# Patient Record
Sex: FEMALE | Race: BLACK OR AFRICAN AMERICAN | NOT HISPANIC OR LATINO | Employment: UNEMPLOYED | URBAN - METROPOLITAN AREA
[De-identification: names, ages, dates, MRNs, and addresses within clinical notes are randomized per-mention and may not be internally consistent; named-entity substitution may affect disease eponyms.]

---

## 2019-08-16 DIAGNOSIS — Z00.00 ROUTINE GENERAL MEDICAL EXAMINATION AT A HEALTH CARE FACILITY: Primary | ICD-10-CM

## 2019-09-12 ENCOUNTER — OFFICE VISIT (OUTPATIENT)
Dept: OBSTETRICS AND GYNECOLOGY | Facility: CLINIC | Age: 54
End: 2019-09-12
Payer: MEDICAID

## 2019-09-12 VITALS — WEIGHT: 274 LBS | HEIGHT: 64 IN | BODY MASS INDEX: 46.78 KG/M2

## 2019-09-12 DIAGNOSIS — E66.01 MORBID OBESITY: ICD-10-CM

## 2019-09-12 DIAGNOSIS — Z01.419 ENCOUNTER FOR GYNECOLOGICAL EXAMINATION WITHOUT ABNORMAL FINDING: Primary | ICD-10-CM

## 2019-09-12 DIAGNOSIS — N89.8 VAGINAL DRYNESS: ICD-10-CM

## 2019-09-12 PROCEDURE — 99386 PREV VISIT NEW AGE 40-64: CPT | Mod: S$GLB,,, | Performed by: OBSTETRICS & GYNECOLOGY

## 2019-09-12 PROCEDURE — 99386 PR PREVENTIVE VISIT,NEW,40-64: ICD-10-PCS | Mod: S$GLB,,, | Performed by: OBSTETRICS & GYNECOLOGY

## 2019-09-12 RX ORDER — TIMOLOL MALEATE 5 MG/ML
SOLUTION/ DROPS OPHTHALMIC
COMMUNITY
Start: 2019-08-28

## 2019-09-12 RX ORDER — ARIPIPRAZOLE 5 MG/1
TABLET ORAL
COMMUNITY
End: 2019-12-16

## 2019-09-12 RX ORDER — METFORMIN HYDROCHLORIDE 1000 MG/1
1000 TABLET ORAL 2 TIMES DAILY
Refills: 0 | COMMUNITY
Start: 2019-09-03

## 2019-09-12 RX ORDER — MONTELUKAST SODIUM 10 MG/1
10 TABLET ORAL DAILY
Refills: 1 | COMMUNITY
Start: 2019-07-30

## 2019-09-12 RX ORDER — LATANOPROST 50 UG/ML
SOLUTION/ DROPS OPHTHALMIC
COMMUNITY
Start: 2019-08-28

## 2019-09-12 RX ORDER — CARVEDILOL 25 MG/1
25 TABLET ORAL 2 TIMES DAILY
Refills: 3 | COMMUNITY
Start: 2019-09-03

## 2019-09-12 RX ORDER — LOSARTAN POTASSIUM 100 MG/1
100 TABLET ORAL DAILY
Refills: 1 | COMMUNITY
Start: 2019-08-30 | End: 2019-12-16

## 2019-09-12 RX ORDER — OMEPRAZOLE 40 MG/1
CAPSULE, DELAYED RELEASE ORAL
COMMUNITY
Start: 2019-08-07

## 2019-09-12 RX ORDER — GLUCOSAM/CHON-MSM1/C/MANG/BOSW 500-416.6
TABLET ORAL
Refills: 3 | COMMUNITY
Start: 2019-07-02 | End: 2019-12-16

## 2019-09-12 RX ORDER — PEN NEEDLE, DIABETIC 31 GX5/16"
NEEDLE, DISPOSABLE MISCELLANEOUS
Refills: 3 | COMMUNITY
Start: 2019-07-02 | End: 2019-12-16

## 2019-09-12 RX ORDER — LOSARTAN POTASSIUM AND HYDROCHLOROTHIAZIDE 25; 100 MG/1; MG/1
TABLET ORAL
COMMUNITY
End: 2019-12-16

## 2019-09-12 RX ORDER — ALBUTEROL SULFATE 90 UG/1
AEROSOL, METERED RESPIRATORY (INHALATION)
COMMUNITY
End: 2019-12-16

## 2019-09-12 RX ORDER — TRAVOPROST OPHTHALMIC SOLUTION 0.04 MG/ML
SOLUTION OPHTHALMIC
COMMUNITY

## 2019-09-12 NOTE — PROGRESS NOTES
Subjective:       Patient ID: Marylin Jennings is a 54 y.o. female.    Chief Complaint:  Annual Exam      History of Present Illness  Pt here for gyn annual.  History and past labs reviewed with patient.    Complaints vag dryness dueing sex.  No other complaints. No vag bleeding      Review of Systems  Review of Systems   Constitutional: Negative for chills and fever.   Respiratory: Negative for shortness of breath.    Cardiovascular: Negative for chest pain.   Gastrointestinal: Negative for abdominal pain, blood in stool, constipation, diarrhea, nausea, vomiting and reflux.   Genitourinary: Negative for dysmenorrhea, dyspareunia, dysuria, hematuria, hot flashes, menorrhagia, menstrual problem, pelvic pain, vaginal bleeding, vaginal discharge, postcoital bleeding and vaginal dryness.   Musculoskeletal: Negative for arthralgias and joint swelling.   Integumentary:  Negative for rash, hair changes, breast mass, nipple discharge and breast skin changes.   Psychiatric/Behavioral: Negative for depression. The patient is not nervous/anxious.    Breast: Negative for asymmetry, lump, mass, nipple discharge and skin changes          Objective:    Physical Exam:   Constitutional: She appears well-developed and well-nourished. No distress.    HENT:   Head: Normocephalic and atraumatic.    Eyes: Conjunctivae and EOM are normal.    Neck: No tracheal deviation present. No thyromegaly present.    Cardiovascular: Exam reveals no clubbing, no cyanosis and no edema.     Pulmonary/Chest: Effort normal. No respiratory distress.        Abdominal: Soft. She exhibits no distension and no mass. There is no tenderness. There is no rebound and no guarding. No hernia.     Genitourinary: Rectum normal, vagina normal and uterus normal. Pelvic exam was performed with patient supine. There is no rash, tenderness, lesion or injury on the right labia. There is no rash, tenderness, lesion or injury on the left labia. Cervix is normal. Right adnexum  displays no mass, no tenderness and no fullness. Left adnexum displays no mass, no tenderness and no fullness.                Skin: She is not diaphoretic. No cyanosis. Nails show no clubbing.        breast exam wnl- no nipple dc, skin changes, masses or lymph nodes palpated bilaterally    Assessment:        1. Encounter for gynecological examination without abnormal finding    2. Morbid obesity    3. Vaginal dryness               Plan:      Annual   Pap today   MMG done  Colonoscopy done  Risk assessment for inherited gyn cancer done   RTC  1 year

## 2019-09-12 NOTE — LETTER
September 12, 2019      Juanito Good MD  [address]  [place] [address]           [facility]  [address]  [place] [address]  Phone: [phone]  Fax: [phone]          Patient: Marylin Jennings   MR Number: [id number]   YOB: 1965   Date of Visit: 9/12/2019       Dear Dr. Juanito Good:    Thank you for referring Marylin Jennings to me for evaluation. Attached you will find relevant portions of my assessment and plan of care.    If you have questions, please do not hesitate to call me. I look forward to following Marylin Jennings along with you.    Sincerely,    Josh Means MD    Enclosure  CC:  No Recipients    If you would like to receive this communication electronically, please contact [email or web address] or [phone] to request more information on [employer] access.    For providers and/or their staff who would like to refer a patient to [facility], please contact us through our one-stop-shop provider referral line, [facility], at [phone].    If you feel you have received this communication in error or would no longer like to receive these types of communications, please e-mail [email or web address]

## 2019-12-16 ENCOUNTER — OFFICE VISIT (OUTPATIENT)
Dept: ORTHOPEDICS | Facility: CLINIC | Age: 54
End: 2019-12-16
Payer: MEDICAID

## 2019-12-16 VITALS — HEIGHT: 63 IN | WEIGHT: 269.5 LBS | TEMPERATURE: 98 F | BODY MASS INDEX: 47.75 KG/M2

## 2019-12-16 DIAGNOSIS — M19.012 GLENOHUMERAL ARTHRITIS, LEFT: Primary | ICD-10-CM

## 2019-12-16 PROCEDURE — 99201 PR OFFICE/OUTPT VISIT,NEW,LEVL I: ICD-10-PCS | Mod: 25,S$GLB,, | Performed by: ORTHOPAEDIC SURGERY

## 2019-12-16 PROCEDURE — 99201 PR OFFICE/OUTPT VISIT,NEW,LEVL I: CPT | Mod: 25,S$GLB,, | Performed by: ORTHOPAEDIC SURGERY

## 2019-12-16 PROCEDURE — 20610 DRAIN/INJ JOINT/BURSA W/O US: CPT | Mod: LT,S$GLB,, | Performed by: ORTHOPAEDIC SURGERY

## 2019-12-16 PROCEDURE — 20610 LARGE JOINT ASPIRATION/INJECTION: L GLENOHUMERAL: ICD-10-PCS | Mod: LT,S$GLB,, | Performed by: ORTHOPAEDIC SURGERY

## 2019-12-16 RX ORDER — FLUTICASONE PROPIONATE 50 MCG
1 SPRAY, SUSPENSION (ML) NASAL DAILY
Refills: 0 | COMMUNITY
Start: 2019-10-29

## 2019-12-16 RX ORDER — TRIAMCINOLONE ACETONIDE 40 MG/ML
40 INJECTION, SUSPENSION INTRA-ARTICULAR; INTRAMUSCULAR
Status: DISCONTINUED | OUTPATIENT
Start: 2019-12-16 | End: 2019-12-16 | Stop reason: HOSPADM

## 2019-12-16 RX ORDER — CLONAZEPAM 2 MG/1
2 TABLET ORAL 2 TIMES DAILY
Refills: 2 | COMMUNITY
Start: 2019-11-25

## 2019-12-16 RX ORDER — INSULIN GLARGINE 100 [IU]/ML
INJECTION, SOLUTION SUBCUTANEOUS
COMMUNITY

## 2019-12-16 RX ORDER — IBUPROFEN 800 MG/1
TABLET ORAL
COMMUNITY

## 2019-12-16 RX ORDER — DICLOFENAC SODIUM 10 MG/G
GEL TOPICAL
COMMUNITY
Start: 2019-11-26

## 2019-12-16 RX ORDER — ASPIRIN 81 MG/1
TABLET ORAL
COMMUNITY

## 2019-12-16 RX ORDER — LORATADINE 10 MG/1
10 TABLET ORAL DAILY
Refills: 0 | COMMUNITY
Start: 2019-11-25

## 2019-12-16 RX ORDER — PRAVASTATIN SODIUM 20 MG/1
20 TABLET ORAL DAILY
Refills: 1 | COMMUNITY
Start: 2019-11-25

## 2019-12-16 RX ORDER — IRBESARTAN 150 MG/1
150 TABLET ORAL DAILY
Refills: 2 | COMMUNITY
Start: 2019-11-25

## 2019-12-16 RX ORDER — POTASSIUM CHLORIDE 750 MG/1
CAPSULE, EXTENDED RELEASE ORAL
COMMUNITY

## 2019-12-16 RX ORDER — PROMETHAZINE HYDROCHLORIDE AND CODEINE PHOSPHATE 6.25; 1 MG/5ML; MG/5ML
SOLUTION ORAL
Refills: 0 | COMMUNITY
Start: 2019-12-09

## 2019-12-16 RX ORDER — TRIAMTERENE AND HYDROCHLOROTHIAZIDE 75; 50 MG/1; MG/1
TABLET ORAL
COMMUNITY
Start: 2019-11-12 | End: 2023-10-19

## 2019-12-16 RX ORDER — HYDROCODONE BITARTRATE AND ACETAMINOPHEN 10; 325 MG/1; MG/1
1 TABLET ORAL EVERY 6 HOURS PRN
Refills: 0 | COMMUNITY
Start: 2019-11-25

## 2019-12-16 RX ORDER — ALBUTEROL SULFATE 90 UG/1
AEROSOL, METERED RESPIRATORY (INHALATION)
COMMUNITY

## 2019-12-16 RX ADMIN — TRIAMCINOLONE ACETONIDE 40 MG: 40 INJECTION, SUSPENSION INTRA-ARTICULAR; INTRAMUSCULAR at 09:12

## 2019-12-16 NOTE — PROGRESS NOTES
Subjective:      Patient ID: Marylin Jennings is a 54 y.o. female.    Chief Complaint: Pain of the Left Shoulder    HPI 54-year-old lady with a several month history of left shoulder pain.  She denies any history of trauma.  She brings in x-rays that show osteoarthritis of the AC joint and of the glenohumeral joint    Review of Systems   Constitution: Negative for fever and weight loss.   Cardiovascular: Negative for chest pain and leg swelling.   Musculoskeletal: Positive for arthritis, joint pain and stiffness. Negative for joint swelling and muscle weakness.   Gastrointestinal: Negative for change in bowel habit.   Genitourinary: Negative for bladder incontinence and hematuria.   Neurological: Negative for focal weakness, numbness, paresthesias and sensory change.         Objective:                        Left Shoulder Exam     Inspection/Observation   Swelling: absent  Deformity: absent    Tenderness   The patient is tender to palpation of the acromioclavicular joint and greater tuberosity.    Range of Motion   Active abduction: 90   Passive abduction: 120   Extension: 40   Forward Flexion: 110 Adduction: 40   External Rotation 0 degrees: normal   Internal rotation 0 degrees: normal     Tests & Signs   Cross arm: positive  Drop arm: negative  Garner test: positive  Rotator Cuff Painful Arc/Range: moderate    Other   Sensation: normal       Muscle Strength   Left Upper Extremity  Shoulder Abduction: 4/5   Shoulder Internal Rotation: 4/5   Shoulder External Rotation: 4/5   Supraspinatus: 4/5/5   Subscapularis: 4/5/5   Biceps: 4/5/5               Assessment:       Encounter Diagnosis   Name Primary?    Glenohumeral arthritis, left Yes          Plan:       Marylin was seen today for pain.    Diagnoses and all orders for this visit:    Glenohumeral arthritis, left  -     Large Joint Aspiration/Injection: L glenohumeral

## 2019-12-16 NOTE — PROCEDURES
Large Joint Aspiration/Injection: L glenohumeral  Date/Time: 12/16/2019 9:30 AM  Performed by: Evaristo Matthew MD  Authorized by: Evaristo Matthew MD     Consent Done?:  Yes (Verbal)  Timeout: Prior to procedure the correct patient, procedure, and site was verified    Anesthesia    Anesthetic: lidocaine 1% without epinephrine  Anesthetic total: 2mL    Location:  Shoulder  Site:  L glenohumeral  Prep: Patient was prepped and draped in usual sterile fashion    Approach:  Posterior  Medications:  40 mg triamcinolone acetonide 40 mg/mL  Patient tolerance:  Patient tolerated the procedure well with no immediate complications

## 2021-02-24 ENCOUNTER — HISTORICAL (OUTPATIENT)
Dept: ADMINISTRATIVE | Facility: HOSPITAL | Age: 56
End: 2021-02-24

## 2021-11-22 ENCOUNTER — HISTORICAL (OUTPATIENT)
Dept: ADMINISTRATIVE | Facility: HOSPITAL | Age: 56
End: 2021-11-22

## 2021-11-22 LAB
ALBUMIN SERPL-MCNC: 4 GM/DL (ref 3.5–5)
ALBUMIN/GLOB SERPL: 1 RATIO (ref 1.1–2)
ALP SERPL-CCNC: 79 UNIT/L (ref 40–150)
ALT SERPL-CCNC: 17 UNIT/L (ref 0–55)
AST SERPL-CCNC: 12 UNIT/L (ref 5–34)
BILIRUB SERPL-MCNC: 0.4 MG/DL
BILIRUBIN DIRECT+TOT PNL SERPL-MCNC: 0.1 MG/DL (ref 0–0.5)
BILIRUBIN DIRECT+TOT PNL SERPL-MCNC: 0.3 MG/DL (ref 0–0.8)
BUN SERPL-MCNC: 38.7 MG/DL (ref 9.8–20.1)
CALCIUM SERPL-MCNC: 10.9 MG/DL (ref 8.7–10.5)
CHLORIDE SERPL-SCNC: 109 MMOL/L (ref 98–107)
CO2 SERPL-SCNC: 24 MMOL/L (ref 22–29)
CREAT SERPL-MCNC: 1.53 MG/DL (ref 0.55–1.02)
EST CREAT CLEARANCE SER (OHS): 33.89 ML/MIN
EST. AVERAGE GLUCOSE BLD GHB EST-MCNC: 180 MG/DL
GLOBULIN SER-MCNC: 4 GM/DL (ref 2.4–3.5)
GLUCOSE SERPL-MCNC: 113 MG/DL (ref 74–100)
HBA1C MFR BLD: 7.9 %
POTASSIUM SERPL-SCNC: 5.2 MMOL/L (ref 3.5–5.1)
PROT SERPL-MCNC: 8 GM/DL (ref 6.4–8.3)
SODIUM SERPL-SCNC: 141 MMOL/L (ref 136–145)

## 2021-11-30 ENCOUNTER — HISTORICAL (OUTPATIENT)
Dept: SURGERY | Facility: HOSPITAL | Age: 56
End: 2021-11-30

## 2021-12-13 ENCOUNTER — HISTORICAL (OUTPATIENT)
Dept: ADMINISTRATIVE | Facility: HOSPITAL | Age: 56
End: 2021-12-13

## 2022-01-20 ENCOUNTER — HISTORICAL (OUTPATIENT)
Dept: ADMINISTRATIVE | Facility: HOSPITAL | Age: 57
End: 2022-01-20

## 2022-04-10 ENCOUNTER — HISTORICAL (OUTPATIENT)
Dept: ADMINISTRATIVE | Facility: HOSPITAL | Age: 57
End: 2022-04-10
Payer: MEDICAID

## 2022-04-26 VITALS
HEIGHT: 64 IN | DIASTOLIC BLOOD PRESSURE: 94 MMHG | BODY MASS INDEX: 44.6 KG/M2 | WEIGHT: 261.25 LBS | SYSTOLIC BLOOD PRESSURE: 183 MMHG

## 2022-04-30 NOTE — H&P
Patient:   Marylin Jennings             MRN: [id number]            FIN: [id number]               Age:   56 years     Sex:  Female     :  1965   Associated Diagnoses:   None   Author:   Isaías Perez Jr, MD      Chief Complaint   Evaluation for left shoulder surgery   History of Present Illness   Patient is a 56-year-old female diabetic last A1c approximately 8 left-hand-dominant history of left rotator cuff tear with biceps tendinitis and AC arthritis who presents to clinic for booking of surgery. Seen in clinic in 2021 and wanted to have surgery done after Thanksgiving. States that since last clinic her pain is increased and she felt a pop in her shoulder. Still would like her left shoulder addressed with surgery.   Review of Systems   Constitutional: no fever, fatigue, weakness  Eye: no vision loss, eye redness, drainage, or pain  ENMT: no sore throat, ear pain, sinus pain/congestion, nasal congestion/drainage  Respiratory: no cough, no wheezing, no shortness of breath  Cardiovascular: no chest pain, no palpitations, no edema  Gastrointestinal: no nausea, vomiting, or diarrhea. No abdominal pain  Physical Exam   Vitals & Measurements HT: 162.00 cm WT: 119.600 kg BMI: 45.57   Shoulder exam Left     Symptoms: chronic     Inspection   Skin: Normal No signs of infection   Atrophy: No atrophy   Warmth: no   Erythema: no     Palpation   Tenderness: anterior joint, AC joint, bicep  Crepitation: none     ROM  Active/passive   Flexion ( 0-160): 0-90 /0-140  Extension: ( 0-50): 0-30/0-50  Abduction: (0-180): 0-90/0-130  External: ( 0-80): 0-60/0-80  Internal: pelvis/pelvis      Strength   Elevation: 4/5  ER: 4/5  IR: 5/5  ABD: 4/5    Special tests     Crossbody abduction: positive     Impingement  Neer: positive       Rotator Cuff   Hornblower: positive   Michaela's positive  Lift off: negative   External rotation lag negative     Biceps tendon  Speed's: positive   Yergason's: positive       Neurovascular    BP: 2+   RP: 2+   Sensation intact distally including axillary   Assessment/Plan   1. Rotator cuff arthropathy M12.819   Patient is a 56-year-old female diabetic, left-hand-dominant who presents to clinic for evaluation for left shoulder pain secondary to rotator cuff tear, biceps tendinitis and AC arthritis. She has failed nonoperative treatment and would like to proceed with agnostic arthroscopy with rotator cuff debridement. Consent done in clinic today.  I have had a discussion with the patient regarding the risk, benefits and alternatives to surgery. We discussed that she is still on the young side for a reverse total shoulder, however anticipate that some but not all of her symptoms will be resolved with this arthroscopic surgery.     ATTENDING ADDENDUM    Marylin Jennings was evaluated at the time of the encounter with Dr Hare. HPI, PE and treatment plan was reviewed. Treatment plan was reasonable and necessary. Imaging was reviewed at the time of visit.     The risks, benefits, outcomes, and alternatives of conservative vs operative management were discussed with the patient in clinic today. Informed consent was obtained for left shoulder arthroscopic rotator cuff repair with bicep tenotomy and distal clavicle excision.

## 2022-04-30 NOTE — OP NOTE
Patient:   Marylin Jennings             MRN: [id number]            FIN: [id number]               Age:   56 years     Sex:  Female     :  1965   Associated Diagnoses:   None   Author:   Chris Shah MD, Isaías UPTON      Operative Note   Operative Information   Date/ Time:  2021 16:19:00.     Procedures Performed: left shoulder arthroscopy, extensive debridement, rotator cuff repair, distal clavicle excision, biceps tenotomy.     Indications: Patient is a 56-year-old female diabetic last A1c approximately 8 left-hand-dominant who presents to clinic for evaluation for left shoulder pain. Patient states that she has had shoulder pain and limited function for several years now. She denies any specific inciting trauma or injury. She states she has received multiple injections as long as multiple rounds of physical therapy with minimal to no relief. Her main concern is pain throughout the left shoulder as well as inability to raise her arm.  Patient had MRI findings demonstrating a rotator cuff tear as well as AC arthritis and biceps tendinitis.  Risks and benefits of operative versus nonoperative treatment were discussed with the patient.  She elected to proceed with surgery.  Consent was obtained. Patient is a 56-year-old female diabetic last A1c approximately 8 left-hand-dominant who presents to clinic for evaluation for left shoulder pain. Patient states that she has had shoulder pain and limited function for several years now. She denies any specific inciting trauma or injury. She states she has received multiple injections as long as multiple rounds of physical therapy with minimal to no relief. Her main concern is pain throughout the left shoulder as well as inability to raise her arm.  Patient had MRI findings demonstrating a rotator cuff tear as well as AC arthritis and biceps tendinitis.  Risks and benefits of operative versus nonoperative treatment were discussed with the patient.  She elected to proceed  with surgery.  Consent was obtained. .     Preoperative Diagnosis: left rotator cuff tear, acromioclavicular arthritis, biceps tendonitis.     Postoperative Diagnosis: Same.     Surgeon: Chris Shah MD, Isaías KAY     Assistant: Payam MILLER, Mela CROOKS, Magen Mcdaniels MD.     Anesthesia: General.     Speciman Removed: None.     Description of Procedure/Findings/    Complications: Patient was taken to the operating room.  She underwent general endotracheal anesthesia.  She was placed in the beachchair position.  Bony prominences were well-padded.  The left upper extremity was prepped and draped in the usual sterile fashion.  Timeout was performed.  The posterior and anterior portals were established.  Diagnostic arthroscopy was performed.  Patient was noted to have a SLAP tear as well as some degeneration of the biceps.  The subscapularis was noted to be intact.  She had grade 1 chondral changes at the glenoid and humeral head.  Extensive debridement was performed.  Biceps tenotomy was performed.  Patient was noted to have a full-thickness rotator cuff tear.  An anterolateral portal was established and the articular side of the cuff was debrided.  We then entered the subacromial space.  Subacromial bursa was debrided.  Patient's rotator cuff tear was again identified.  The cuff was to be debrided.  Patient was noted to have a large crescent-shaped full-thickness tear.  A speed bridge repair was then performed with the addition of a fiber link anteriorly to close down the dog ear. 4.5mm anchors x 2 were placed medially. A 7mm anchor was placed anterolaterally due to poor quality bone (the 4.5mm and 5.5mm anchors pulled out when placed). A 5.5mm anchor was placed posterolaterally.  Final images were taken of the cuff repair.  Next, distal clavicle excision was performed.  We excised approximately 8 mm of bone from the distal clavicle using a bur.  Hemostasis was obtained.  Final images were obtained.  Incisions were closed  with Monocryl, Dermabond, and Steri-Strips.  A soft dressing was placed. Patient was taken to the operating room.  She underwent general endotracheal anesthesia.  She was placed in the beachchair position.  Bony prominences were well-padded.  The left upper extremity was prepped and draped in the usual sterile fashion.  Timeout was performed.  The posterior and anterior portals were established.  Diagnostic arthroscopy was performed.  Patient was noted to have a SLAP tear as well as some degeneration of the biceps.  The subscapularis was noted to be intact.  She had grade 1 chondral changes at the glenoid and humeral head.  Extensive debridement was performed.  Biceps tenotomy was performed.  Patient was noted to have a full-thickness rotator cuff tear.  An anterolateral portal was established and the articular side of the cuff was debrided.  We then entered the subacromial space.  Subacromial bursa was debrided.  Patient's rotator cuff tear was again identified.  The cuff was to be abraded.  Patient was noted to have a large crescent-shaped full-thickness tear.  A speed bridge repair was then performed with the addition of a fiber link anteriorly to close down the dog ear. 4.5mm anchors x 2 were placed medially. A 7mm anchor was placed anterolaterally due to poor quality bone (the 4.5mm and 5.5mm anchors pulled out when placed). A 5.5mm anchor was placed posterolaterally.  Final images were taken of the cuff repair.  Next, distal clavicle excision was performed.  We excised approximately 8 mm of bone from the distal clavicle using a bur.  Hemostasis was obtained.  Final images were obtained.  Incisions were closed with Monocryl, Dermabond, and Steri-Strips.  A soft dressing was placed. .     Esimated blood loss: loss  15  cc.     Findings: rotator cuff tear, AC arthritis, SLAP tear.     Complications: None.     Notes: Follow-up in clinic in 2 weeks.  In the meantime, keep dressing clean and dry.  Wear sling at all  times except work on elbow range of motion.  Nonweightbearing to the left upper extremity..       I was present and scrubbed from opening until closure.Isaías Perez MD

## 2023-01-12 ENCOUNTER — HOSPITAL ENCOUNTER (EMERGENCY)
Facility: HOSPITAL | Age: 58
Discharge: HOME | End: 2023-01-12
Attending: INTERNAL MEDICINE
Payer: MEDICARE

## 2023-01-12 VITALS
BODY MASS INDEX: 43.66 KG/M2 | DIASTOLIC BLOOD PRESSURE: 94 MMHG | SYSTOLIC BLOOD PRESSURE: 176 MMHG | RESPIRATION RATE: 17 BRPM | WEIGHT: 255.75 LBS | HEIGHT: 64 IN | TEMPERATURE: 98 F | OXYGEN SATURATION: 98 % | HEART RATE: 80 BPM

## 2023-01-12 DIAGNOSIS — R51.9 ACUTE NONINTRACTABLE HEADACHE, UNSPECIFIED HEADACHE TYPE: ICD-10-CM

## 2023-01-12 DIAGNOSIS — I10 UNCONTROLLED HYPERTENSION: Primary | ICD-10-CM

## 2023-01-12 LAB — POCT GLUCOSE: 101 MG/DL (ref 70–110)

## 2023-01-12 PROCEDURE — 82962 GLUCOSE BLOOD TEST: CPT

## 2023-01-12 PROCEDURE — 25000003 PHARM REV CODE 250: Performed by: PHYSICIAN ASSISTANT

## 2023-01-12 PROCEDURE — 99284 EMERGENCY DEPT VISIT MOD MDM: CPT | Mod: 25

## 2023-01-12 RX ORDER — BACLOFEN 20 MG/1
20 TABLET ORAL 3 TIMES DAILY PRN
Qty: 20 TABLET | Refills: 0 | OUTPATIENT
Start: 2023-01-12 | End: 2023-03-01

## 2023-01-12 RX ORDER — HYDRALAZINE HYDROCHLORIDE 25 MG/1
25 TABLET, FILM COATED ORAL
Status: COMPLETED | OUTPATIENT
Start: 2023-01-12 | End: 2023-01-12

## 2023-01-12 RX ADMIN — HYDRALAZINE HYDROCHLORIDE 25 MG: 25 TABLET ORAL at 02:01

## 2023-01-12 NOTE — DISCHARGE INSTRUCTIONS
Report to Emergency Department if symptoms return or worsen; [facility] - Medicine Clinic Within 1 to 2 days, It is important that you follow up with your primary care provider or specialist if indicated for further evaluation, workup, and treatment as necessary. The exam and treatment you received in Emergency Department was for an urgent problem and NOT INTENDED AS COMPLETE CARE. It is important that you FOLLOW UP with a doctor for ongoing care. If your symptoms become WORSE or you DO NOT IMPROVE and you are unable to reach your health care provider, you should RETURN to the Emergency Department. The Emergency Department provider has provided a PRELIMINARY INTERPRETATION of all your studies. A final interpretation may be done after you are discharged. If a change in your diagnosis or treatment is needed WE WILL CONTACT YOU. It is critical that we have a CURRENT PHONE NUMBER FOR YOU.

## 2023-01-12 NOTE — ED PROVIDER NOTES
Encounter Date: 2023       History     Chief Complaint   Patient presents with    Hypertension     PT REPORTS ELEVATED BP AT HOME, CO HA. DENIES VISION CHANGES, NO CP OR SOB.  /102 IN TRIAGE.  REPORTS COMPLIANCE W MEDS.  .      58 yo F w/ PMHx significant for HTN, HLD, obesity & DM presents to ED c/o elevated BP readings at home w/ associated HA. Patient reports this is the worst HA she's ever had. Patient reports BP of 180/90 at home. BP of 212/102 in triage. Reports full compliance w/ meds. Reports that she has not been compliant w/ diet. Saw her PCP yesterday, but states BP wasn't this elevated in clinic. Denies vision changes, slurred speech, facial drooping, photophobia, neck stiffness, vertigo, lightheadedness, confusion, focal weakness, paralysis, paresthesia, numbness, ataxia, abnormal balance, CP, SOB, palpitations, diaphoresis, syncope, orthopnea, edema. Aside from HTN, vitals otherwise stable w/ NAD.    Review of patient's allergies indicates:  No Known Allergies  Past Medical History:   Diagnosis Date    Arthritis     Depression     Diabetes mellitus     Diabetes mellitus type I     Head ache     Hepatitis C     High cholesterol     Hypertension      Past Surgical History:   Procedure Laterality Date    CARPAL TUNNEL RELEASE       SECTION      CHOLECYSTECTOMY      TOTAL KNEE ARTHROPLASTY       No family history on file.  Social History     Tobacco Use    Smoking status: Never   Substance Use Topics    Alcohol use: Never    Drug use: Never     Review of Systems   Constitutional:  Negative for appetite change, chills, fever and unexpected weight change.   HENT:  Negative for hearing loss, trouble swallowing and voice change.    Genitourinary:  Negative for difficulty urinating and enuresis.   Musculoskeletal:  Negative for arthralgias and myalgias.   Skin:  Negative for color change, pallor and rash.   Allergic/Immunologic: Negative for immunocompromised state.   All other systems  reviewed and are negative.    Physical Exam     Initial Vitals [01/12/23 1410]   BP Pulse Resp Temp SpO2   (!) 212/102 80 16 98.4 °F (36.9 °C) 98 %      MAP       --         Physical Exam    Nursing note and vitals reviewed.  Constitutional: She appears well-developed and well-nourished. She is not diaphoretic. No distress.   HENT:   Head: Normocephalic and atraumatic.   Right Ear: Hearing, tympanic membrane, external ear and ear canal normal.   Left Ear: Hearing, tympanic membrane, external ear and ear canal normal.   Mouth/Throat: Oropharynx is clear and moist. No oropharyngeal exudate.   Eyes: Conjunctivae and EOM are normal. Pupils are equal, round, and reactive to light.   Neck: Neck supple. No Brudzinski's sign and no Kernig's sign noted.   Normal range of motion.   Full passive range of motion without pain.     Cardiovascular:  Normal rate, regular rhythm, normal heart sounds and intact distal pulses.     Exam reveals no gallop and no friction rub.       No murmur heard.  Pulmonary/Chest: Breath sounds normal. No respiratory distress. She has no wheezes. She has no rhonchi. She has no rales.   Abdominal: Abdomen is soft. Bowel sounds are normal. She exhibits no distension and no mass. There is no abdominal tenderness. There is no rebound and no guarding.   Musculoskeletal:         General: No tenderness or edema. Normal range of motion.      Cervical back: Full passive range of motion without pain, normal range of motion and neck supple. No rigidity. No spinous process tenderness or muscular tenderness. Normal range of motion.     Lymphadenopathy:     She has no cervical adenopathy.   Neurological: She is alert and oriented to person, place, and time. She has normal strength. She is not disoriented. She displays no tremor. No cranial nerve deficit or sensory deficit. She exhibits normal muscle tone. She displays a negative Romberg sign. She displays no seizure activity. Coordination and gait normal. GCS score  is 15. GCS eye subscore is 4. GCS verbal subscore is 5. GCS motor subscore is 6.   Skin: Skin is warm and dry. Capillary refill takes less than 2 seconds. No rash noted. No erythema. No pallor.   Psychiatric: She has a normal mood and affect. Thought content normal.       ED Course   Procedures  Labs Reviewed   POCT GLUCOSE          Imaging Results              CT Head Without Contrast (Final result)  Result time 01/12/23 16:17:25      Final result by Darlin Ferrera MD (01/12/23 16:17:25)                   Impression:      No acute abnormality seen      Electronically signed by: Darlin Ferrera  Date:    01/12/2023  Time:    16:17               Narrative:    EXAMINATION:  CT HEAD WITHOUT CONTRAST    CLINICAL HISTORY:  Headache, new or worsening (Age >= 50y);    TECHNIQUE:  Multiple axial images were obtained from the base of the brain to the vertex without contrast administration.  Sagittal and coronal reconstructions were performed. .Automatic exposure control  (AEC) is utilized to reduce patient radiation exposure.    COMPARISON:  None available    FINDINGS:  There is no intracranial mass or lesion seen.  No hemorrhage is seen.  No infarct is seen.  The ventricles and basilar cisterns appear normal.  Brain parenchyma appears grossly unremarkable.    Posterior fossa appears normal.  The calvarium is intact.  The paranasal sinuses appear grossly unremarkable.                                       Medications   hydrALAZINE tablet 25 mg (25 mg Oral Given 1/12/23 1427)     Medical Decision Making:   Clinical Tests:   Lab Tests: Ordered and Reviewed  Radiological Study: Ordered and Reviewed  No acute abnormality on head CT. Benign neuro exam w/o focal deficits or meningeal signs. Given hydralazine in ED. Discussed importance of low-salt diet. Encouraged patient to call PCP tomorrow regarding possible med adjustment. ED precautions given.                        Clinical Impression:   Final diagnoses:  [I10]  Uncontrolled hypertension (Primary)  [R51.9] Acute nonintractable headache, unspecified headache type        ED Disposition Condition    Discharge Good          ED Prescriptions       Medication Sig Dispense Start Date End Date Auth. Provider    baclofen (LIORESAL) 20 MG tablet Take 1 tablet (20 mg total) by mouth 3 (three) times daily as needed (neck pain or headache). 20 tablet 1/12/2023 -- LILLIE Lucas          Follow-up Information       Follow up With Specialties Details Why Contact Info    Call primary care provider tomorrow to discuss possible adjustment of blood pressure medicine   As needed, If symptoms worsen     [facility] - Emergency Dept Emergency Medicine   [address] [facility] [address] [phone]             LILLIE Lucas  01/12/23 8942

## 2023-03-01 ENCOUNTER — HOSPITAL ENCOUNTER (EMERGENCY)
Facility: HOSPITAL | Age: 58
Discharge: HOME | End: 2023-03-01
Attending: EMERGENCY MEDICINE
Payer: MEDICARE

## 2023-03-01 VITALS
BODY MASS INDEX: 43.51 KG/M2 | HEIGHT: 64 IN | OXYGEN SATURATION: 100 % | TEMPERATURE: 98 F | SYSTOLIC BLOOD PRESSURE: 132 MMHG | WEIGHT: 254.88 LBS | RESPIRATION RATE: 18 BRPM | HEART RATE: 86 BPM | DIASTOLIC BLOOD PRESSURE: 78 MMHG

## 2023-03-01 DIAGNOSIS — M79.652 LEFT THIGH PAIN: Primary | ICD-10-CM

## 2023-03-01 DIAGNOSIS — R25.2 LEG CRAMPING: ICD-10-CM

## 2023-03-01 LAB
ANION GAP SERPL CALC-SCNC: 6 MEQ/L
ANION GAP SERPL CALC-SCNC: 8 MEQ/L
BUN SERPL-MCNC: 54.2 MG/DL (ref 9.8–20.1)
BUN SERPL-MCNC: 61 MG/DL (ref 9.8–20.1)
CALCIUM SERPL-MCNC: 10.5 MG/DL (ref 8.4–10.2)
CALCIUM SERPL-MCNC: 10.6 MG/DL (ref 8.4–10.2)
CHLORIDE SERPL-SCNC: 110 MMOL/L (ref 98–107)
CHLORIDE SERPL-SCNC: 111 MMOL/L (ref 98–107)
CO2 SERPL-SCNC: 20 MMOL/L (ref 23–31)
CO2 SERPL-SCNC: 22 MMOL/L (ref 23–31)
CREAT SERPL-MCNC: 1.83 MG/DL (ref 0.55–1.02)
CREAT SERPL-MCNC: 2.35 MG/DL (ref 0.55–1.02)
CREAT/UREA NIT SERPL: 26
CREAT/UREA NIT SERPL: 30
D DIMER PPP IA.FEU-MCNC: 0.37 UG/ML FEU (ref 0–0.5)
GFR SERPLBLD CREATININE-BSD FMLA CKD-EPI: 23 MLS/MIN/1.73/M2
GFR SERPLBLD CREATININE-BSD FMLA CKD-EPI: 31 MLS/MIN/1.73/M2
GLUCOSE SERPL-MCNC: 170 MG/DL (ref 82–115)
GLUCOSE SERPL-MCNC: 86 MG/DL (ref 82–115)
MAGNESIUM SERPL-MCNC: 1.8 MG/DL (ref 1.6–2.6)
POTASSIUM SERPL-SCNC: 4.7 MMOL/L (ref 3.5–5.1)
POTASSIUM SERPL-SCNC: 5 MMOL/L (ref 3.5–5.1)
SODIUM SERPL-SCNC: 138 MMOL/L (ref 136–145)
SODIUM SERPL-SCNC: 139 MMOL/L (ref 136–145)

## 2023-03-01 PROCEDURE — 80048 BASIC METABOLIC PNL TOTAL CA: CPT | Performed by: PHYSICIAN ASSISTANT

## 2023-03-01 PROCEDURE — 96361 HYDRATE IV INFUSION ADD-ON: CPT

## 2023-03-01 PROCEDURE — 83735 ASSAY OF MAGNESIUM: CPT | Performed by: PHYSICIAN ASSISTANT

## 2023-03-01 PROCEDURE — 96360 HYDRATION IV INFUSION INIT: CPT

## 2023-03-01 PROCEDURE — 25000003 PHARM REV CODE 250: Performed by: PHYSICIAN ASSISTANT

## 2023-03-01 PROCEDURE — 85379 FIBRIN DEGRADATION QUANT: CPT | Performed by: PHYSICIAN ASSISTANT

## 2023-03-01 PROCEDURE — 63600175 PHARM REV CODE 636 W HCPCS: Performed by: PHYSICIAN ASSISTANT

## 2023-03-01 PROCEDURE — 99284 EMERGENCY DEPT VISIT MOD MDM: CPT | Mod: 25

## 2023-03-01 RX ORDER — CYCLOBENZAPRINE HCL 10 MG
10 TABLET ORAL
Status: COMPLETED | OUTPATIENT
Start: 2023-03-01 | End: 2023-03-01

## 2023-03-01 RX ORDER — HYDROCODONE BITARTRATE AND ACETAMINOPHEN 7.5; 325 MG/1; MG/1
1 TABLET ORAL ONCE
Status: COMPLETED | OUTPATIENT
Start: 2023-03-01 | End: 2023-03-01

## 2023-03-01 RX ORDER — TIZANIDINE 4 MG/1
4 TABLET ORAL EVERY 6 HOURS PRN
Qty: 20 TABLET | Refills: 0 | Status: SHIPPED | OUTPATIENT
Start: 2023-03-01 | End: 2023-03-11

## 2023-03-01 RX ADMIN — HYDROCODONE BITARTRATE AND ACETAMINOPHEN 1 TABLET: 7.5; 325 TABLET ORAL at 10:03

## 2023-03-01 RX ADMIN — CYCLOBENZAPRINE 10 MG: 10 TABLET, FILM COATED ORAL at 02:03

## 2023-03-01 RX ADMIN — SODIUM CHLORIDE, POTASSIUM CHLORIDE, SODIUM LACTATE AND CALCIUM CHLORIDE 1000 ML: 600; 310; 30; 20 INJECTION, SOLUTION INTRAVENOUS at 02:03

## 2023-03-01 RX ADMIN — SODIUM CHLORIDE, POTASSIUM CHLORIDE, SODIUM LACTATE AND CALCIUM CHLORIDE 1000 ML: 600; 310; 30; 20 INJECTION, SOLUTION INTRAVENOUS at 12:03

## 2023-03-01 NOTE — ED PROVIDER NOTES
"Encounter Date: 3/1/2023       History     Chief Complaint   Patient presents with    Leg Pain     L thigh and hip pain x1wk     59 yo F w/ PMHx significant for HTN, HLD, DM, arthritis & obesity presents to ED c/o 1 week hx of constant L hip & thigh pain. Patient reports pain feels like "shayla horse." Taking NSAID & muscle relaxer w/o significant relief. Denies any falls or other known injury. Denies LE edema or calf pain. Able to bear weight & ambulate, but w/ significant pain. Denies numbness, paresthesia, paralysis, saddle anesthesia, incontinence, bowel/bladder retention, F/C, diffuse muscle cramps. VSS on arrival, patient in NAD.    Review of patient's allergies indicates:  No Known Allergies  Past Medical History:   Diagnosis Date    Arthritis     Depression     Diabetes mellitus     Diabetes mellitus type I     Head ache     Hepatitis C     High cholesterol     Hypertension      Past Surgical History:   Procedure Laterality Date    CARPAL TUNNEL RELEASE       SECTION      CHOLECYSTECTOMY      TOTAL KNEE ARTHROPLASTY       History reviewed. No pertinent family history.  Social History     Tobacco Use    Smoking status: Never   Substance Use Topics    Alcohol use: Never    Drug use: Never     Review of Systems   Eyes:  Negative for visual disturbance.   Respiratory:  Negative for cough and shortness of breath.    Cardiovascular:  Negative for chest pain and palpitations.   Gastrointestinal:  Negative for abdominal pain, diarrhea, nausea and vomiting.   Endocrine: Negative for polydipsia, polyphagia and polyuria.   Genitourinary:  Negative for decreased urine volume and hematuria.   Skin:  Negative for color change, pallor and rash.   Allergic/Immunologic: Negative for immunocompromised state.   Neurological:  Negative for dizziness, syncope and headaches.   All other systems reviewed and are negative.    Physical Exam     Initial Vitals   BP Pulse Resp Temp SpO2   23 0915 23 0913 23 " 0913 03/01/23 0913 03/01/23 0913   135/83 92 20 97.9 °F (36.6 °C) 97 %      MAP       --                Physical Exam    Nursing note and vitals reviewed.  Constitutional: She appears well-developed and well-nourished. She is not diaphoretic. No distress.   HENT:   Head: Normocephalic and atraumatic.   Mouth/Throat: Oropharynx is clear and moist. No oropharyngeal exudate.   Eyes: Conjunctivae and EOM are normal. Pupils are equal, round, and reactive to light.   Neck: Neck supple.   Normal range of motion.  Cardiovascular:  Normal rate, regular rhythm, normal heart sounds and intact distal pulses.     Exam reveals no gallop and no friction rub.       No murmur heard.  Pulmonary/Chest: Breath sounds normal. No respiratory distress. She has no wheezes. She has no rhonchi. She has no rales.   Abdominal: Abdomen is soft. Bowel sounds are normal. She exhibits no distension. There is no abdominal tenderness. There is no rebound and no guarding.   Musculoskeletal:         General: No tenderness or edema. Normal range of motion.      Cervical back: Normal range of motion and neck supple.     Neurological: She is alert and oriented to person, place, and time. She has normal strength. No cranial nerve deficit or sensory deficit.   Skin: Skin is warm and dry. Capillary refill takes less than 2 seconds. No rash noted. No erythema. No pallor.   Psychiatric: She has a normal mood and affect. Thought content normal.       ED Course   Procedures  Labs Reviewed   BASIC METABOLIC PANEL - Abnormal; Notable for the following components:       Result Value    Chloride 111 (*)     Carbon Dioxide 22 (*)     Glucose Level 170 (*)     Blood Urea Nitrogen 61.0 (*)     Creatinine 2.35 (*)     Calcium Level Total 10.6 (*)     All other components within normal limits   BASIC METABOLIC PANEL - Abnormal; Notable for the following components:    Chloride 110 (*)     Carbon Dioxide 20 (*)     Blood Urea Nitrogen 54.2 (*)     Creatinine 1.83 (*)      Calcium Level Total 10.5 (*)     All other components within normal limits   D DIMER, QUANTITATIVE - Normal   MAGNESIUM - Normal   EXTRA TUBES    Narrative:     The following orders were created for panel order EXTRA TUBES.  Procedure                               Abnormality         Status                     ---------                               -----------         ------                     Red Top Hold[[id number]]                                     In process                 Lavender Top Hold[[id number]]                                In process                   Please view results for these tests on the individual orders.   RED TOP HOLD   LAVENDER TOP HOLD          Imaging Results              X-Ray Hip 2 or 3 views Left (with Pelvis when performed) (Final result)  Result time 03/01/23 12:12:04      Final result by Wilma Bradshaw MD (03/01/23 12:12:04)                   Impression:      No displaced fracture identified.      Electronically signed by: Wilma Bradshaw  Date:    03/01/2023  Time:    12:12               Narrative:    EXAMINATION:  XR HIP WITH PELVIS WHEN PERFORMED, 2 OR 3 VIEWS LEFT    CLINICAL HISTORY:  L hip pain;    COMPARISON:  None.    FINDINGS:  There is no displaced fracture identified.  There are mild degenerative changes of the hips with small marginal osteophytes.  The soft tissues are unremarkable.                                       X-Ray Lumbar Spine Ap And Lateral (Final result)  Result time 03/01/23 12:11:20      Final result by Wilma Bradshaw MD (03/01/23 12:11:20)                   Impression:      1. No acute abnormality identified.  2. Multilevel degenerative changes of the lumbar spine.      Electronically signed by: Wilma Bradshaw  Date:    03/01/2023  Time:    12:11               Narrative:    EXAMINATION:  XR LUMBAR SPINE AP AND LATERAL    CLINICAL HISTORY:  low back pain;    COMPARISON:  None.    FINDINGS:  There are 5 non-rib-bearing lumbar type vertebral  bodies.  There is minimal retrolisthesis of L5 over S1.  The vertebral body heights are maintained.  There is mild disc height loss at L5-S1 with small marginal osteophytes.  There is mild facet arthropathy.  Scattered vascular calcifications are noted.                                       Medications   HYDROcodone-acetaminophen 7.5-325 mg per tablet 1 tablet (1 tablet Oral Given 3/1/23 1018)   lactated ringers bolus 1,000 mL (0 mLs Intravenous Stopped 3/1/23 1315)   cyclobenzaprine tablet 10 mg (10 mg Oral Given 3/1/23 1426)   lactated ringers bolus 1,000 mL (0 mLs Intravenous Stopped 3/1/23 1524)     Medical Decision Making:   Clinical Tests:   Lab Tests: Ordered and Reviewed  Radiological Study: Ordered and Reviewed  Initial BMP reveals elevated creatinine of 2.35. Patient given 2 L IVFs & Cr improved to 1.83 on repeat BMP. Discussed w/ patient importance of aggressive oral hydration over next 24 hours. Electrolytes unremarkable. D-dimer WNL, low suspicion for DVT as source of leg pain. No acute osseous abnormality on XR of low back or L hip. Patient given meds in ED & reports improvement of leg pain. Will discharge w/ muscle relaxer. Instructed to contact PCP for follow-up appointment. ED precautions given for new or worsening symptoms.           ED Course as of 03/01/23 1724   Wed Mar 01, 2023   1207 Renal indices consistent w/ prerenal [place]. Will give patient IVFs. [NB]      ED Course User Index  [NB] LILLIE Lucas                 Clinical Impression:   Final diagnoses:  [M79.652] Left thigh pain (Primary)  [R25.2] Leg cramping        ED Disposition Condition    Discharge Good          ED Prescriptions       Medication Sig Dispense Start Date End Date Auth. Provider    tiZANidine (ZANAFLEX) 4 MG tablet Take 1 tablet (4 mg total) by mouth every 6 (six) hours as needed (muscle cramps). 20 tablet 3/1/2023 3/11/2023 LILLIE Lucas          Follow-up Information       Follow up With Specialties Details Why  Contact Info    Juanito Good MD Family Medicine Schedule an appointment as soon as possible for a visit   [address]  [address]  [place] [address]      [facility] Emergency Medicine  As needed, If symptoms worsen [address] [place] [address] [phone]             LILLIE Lucas  03/01/23 172       LILLIE Lucas  03/01/23 1727

## 2023-03-01 NOTE — DISCHARGE INSTRUCTIONS
Report to Emergency Department if symptoms return or worsen; [facility] Within 1 to 2 days, It is important that you follow up with your primary care provider or specialist if indicated for further evaluation, workup, and treatment as necessary. The exam and treatment you received in Emergency Department was for an urgent problem and NOT INTENDED AS COMPLETE CARE. It is important that you FOLLOW UP with a doctor for ongoing care. If your symptoms become WORSE or you DO NOT IMPROVE and you are unable to reach your health care provider, you should RETURN to the Emergency Department. The Emergency Department provider has provided a PRELIMINARY INTERPRETATION of all your studies. A final interpretation may be done after you are discharged. If a change in your diagnosis or treatment is needed WE WILL CONTACT YOU. It is critical that we have a CURRENT PHONE NUMBER FOR YOU.

## 2023-04-28 ENCOUNTER — HOSPITAL ENCOUNTER (EMERGENCY)
Facility: HOSPITAL | Age: 58
Discharge: HOME | End: 2023-04-28
Attending: FAMILY MEDICINE
Payer: MEDICARE

## 2023-04-28 VITALS
TEMPERATURE: 98 F | HEIGHT: 64 IN | SYSTOLIC BLOOD PRESSURE: 155 MMHG | WEIGHT: 256 LBS | BODY MASS INDEX: 43.71 KG/M2 | DIASTOLIC BLOOD PRESSURE: 97 MMHG | RESPIRATION RATE: 20 BRPM | HEART RATE: 77 BPM | OXYGEN SATURATION: 98 %

## 2023-04-28 DIAGNOSIS — M25.561 RIGHT KNEE PAIN, UNSPECIFIED CHRONICITY: Primary | ICD-10-CM

## 2023-04-28 DIAGNOSIS — M17.10 ARTHRITIS OF KNEE: ICD-10-CM

## 2023-04-28 DIAGNOSIS — R52 PAIN: ICD-10-CM

## 2023-04-28 PROCEDURE — 25000003 PHARM REV CODE 250: Performed by: PHYSICIAN ASSISTANT

## 2023-04-28 PROCEDURE — 99283 EMERGENCY DEPT VISIT LOW MDM: CPT

## 2023-04-28 RX ORDER — HYDROCODONE BITARTRATE AND ACETAMINOPHEN 10; 325 MG/1; MG/1
1 TABLET ORAL
Status: COMPLETED | OUTPATIENT
Start: 2023-04-28 | End: 2023-04-28

## 2023-04-28 RX ADMIN — HYDROCODONE BITARTRATE AND ACETAMINOPHEN 1 TABLET: 10; 325 TABLET ORAL at 11:04

## 2023-04-28 NOTE — ED PROVIDER NOTES
Encounter Date: 2023       History     Chief Complaint   Patient presents with    Knee Pain     PT W LT KNEE PAIN X 3 DAYS.  DENIES INJURY.       Patient reports to the ER with complaints of knee pain; pt denies fall/injury but does report pain in the pst similar to this    The history is provided by the patient.   Knee Pain  This is a recurrent problem. The current episode started more than 2 days ago. The problem occurs constantly. The problem has not changed since onset.Pertinent negatives include no chest pain, no abdominal pain, no headaches and no shortness of breath. The symptoms are aggravated by walking and standing. The symptoms are relieved by rest. She has tried rest for the symptoms. The treatment provided mild relief.   Review of patient's allergies indicates:  No Known Allergies  Past Medical History:   Diagnosis Date    Arthritis     Depression     Diabetes mellitus     Diabetes mellitus type I     Head ache     Hepatitis C     High cholesterol     Hypertension      Past Surgical History:   Procedure Laterality Date    CARPAL TUNNEL RELEASE       SECTION      CHOLECYSTECTOMY      TOTAL KNEE ARTHROPLASTY       History reviewed. No pertinent family history.  Social History     Tobacco Use    Smoking status: Never    Smokeless tobacco: Never   Substance Use Topics    Alcohol use: Never    Drug use: Never     Review of Systems   Constitutional:  Negative for fever.   HENT:  Negative for sore throat.    Eyes: Negative.    Respiratory:  Negative for shortness of breath.    Cardiovascular:  Negative for chest pain.   Gastrointestinal:  Negative for abdominal pain and nausea.   Genitourinary:  Negative for dysuria.   Musculoskeletal:  Negative for back pain.   Skin:  Negative for rash.   Neurological:  Negative for weakness and headaches.   Hematological:  Does not bruise/bleed easily.   Psychiatric/Behavioral: Negative.       Physical Exam     Initial Vitals [23 0907]   BP Pulse Resp Temp  SpO2   (!) 155/97 77 16 97.9 °F (36.6 °C) 98 %      MAP       --         Physical Exam    Vitals reviewed.  Constitutional: She appears well-developed and well-nourished.   HENT:   Head: Normocephalic and atraumatic.   Eyes: Conjunctivae and EOM are normal. Pupils are equal, round, and reactive to light.   Neck: Neck supple.   Normal range of motion.  Cardiovascular:  Normal rate, regular rhythm and normal heart sounds.           Pulmonary/Chest: Breath sounds normal. No respiratory distress. She has no wheezes. She has no rhonchi. She exhibits no tenderness.   Abdominal: Abdomen is soft. Bowel sounds are normal. She exhibits no distension. There is no abdominal tenderness. There is no rebound.   Musculoskeletal:      Cervical back: Normal range of motion and neck supple.      Right knee: Normal. Normal pulse.      Left knee: No swelling. Decreased range of motion. Tenderness present. Normal pulse.        Legs:      Neurological: She is alert and oriented to person, place, and time. She displays normal reflexes. No cranial nerve deficit or sensory deficit.   Skin: Skin is warm and dry.   Psychiatric: She has a normal mood and affect. Her behavior is normal. Judgment and thought content normal.       ED Course   Procedures  Labs Reviewed - No data to display       Imaging Results              X-Ray Knee 3 View Left (Final result)  Result time 04/28/23 11:22:44      Final result by Josh Prasad MD (04/28/23 11:22:44)                   Impression:      Degenerative changes.      Electronically signed by: Josh Prasad  Date:    04/28/2023  Time:    11:22               Narrative:    EXAMINATION:  XR KNEE 3 VIEW LEFT    CLINICAL HISTORY:  Pain, unspecified    COMPARISON:  None    FINDINGS:  Three views of the left knee demonstrate no fracture or dislocation.  There are degenerative changes with prominent osteophytosis.  No sizeable joint effusion.                                       Medications    HYDROcodone-acetaminophen  mg per tablet 1 tablet (has no administration in time range)                 ED Course as of 04/28/23 1145   Fri Apr 28, 2023   1117 Patient was called at 1030, 1045, and 1100 in the waiting room with no answer; pt also did not answer her phone . . . Patient just returned from the cafeteria (never informed staff) [AL]      ED Course User Index  [AL] LILLIE Horn                 Clinical Impression:   Final diagnoses:  [R52] Pain  [M25.561] Right knee pain, unspecified chronicity (Primary)  [M17.10] Arthritis of knee        ED Disposition Condition    Discharge Stable          ED Prescriptions    None       Follow-up Information       Follow up With Specialties Details Why Contact Info    discharge followup    If your symptoms become WORSE or you DO NOT IMPROVE and you are unable to reach your health care provider, you should RETURN to the emergency department    discharge info    Discussed all pertinent ED information, results, diagnosis and treatment plan; All questions and concerns were addressed at this time. Patient voices understanding of information and instructions. Patient is comfortable with plan and discharge    Juanito Good MD Family Medicine   [address]  [address]  [place] [address]               LILLIE Horn  04/28/23 1141

## 2023-06-15 DIAGNOSIS — M25.519 SHOULDER PAIN, UNSPECIFIED CHRONICITY, UNSPECIFIED LATERALITY: Primary | ICD-10-CM

## 2023-06-22 DIAGNOSIS — M25.512 LEFT SHOULDER PAIN: Primary | ICD-10-CM

## 2023-06-29 ENCOUNTER — OFFICE VISIT (OUTPATIENT)
Dept: ORTHOPEDICS | Facility: CLINIC | Age: 58
End: 2023-06-29
Payer: MEDICARE

## 2023-06-29 ENCOUNTER — HOSPITAL ENCOUNTER (OUTPATIENT)
Dept: RADIOLOGY | Facility: CLINIC | Age: 58
Discharge: HOME | End: 2023-06-29
Attending: ORTHOPAEDIC SURGERY
Payer: MEDICARE

## 2023-06-29 ENCOUNTER — LAB VISIT (OUTPATIENT)
Dept: LAB | Facility: HOSPITAL | Age: 58
End: 2023-06-29
Attending: ORTHOPAEDIC SURGERY
Payer: MEDICARE

## 2023-06-29 VITALS — BODY MASS INDEX: 43.71 KG/M2 | WEIGHT: 256 LBS | HEIGHT: 64 IN

## 2023-06-29 DIAGNOSIS — E11.22 TYPE 2 DIABETES MELLITUS WITH CHRONIC KIDNEY DISEASE, WITH LONG-TERM CURRENT USE OF INSULIN, UNSPECIFIED CKD STAGE: ICD-10-CM

## 2023-06-29 DIAGNOSIS — R52 PAIN MANAGEMENT: ICD-10-CM

## 2023-06-29 DIAGNOSIS — E66.01 OBESITY, MORBID: ICD-10-CM

## 2023-06-29 DIAGNOSIS — Z79.4 TYPE 2 DIABETES MELLITUS WITH CHRONIC KIDNEY DISEASE, WITH LONG-TERM CURRENT USE OF INSULIN, UNSPECIFIED CKD STAGE: ICD-10-CM

## 2023-06-29 DIAGNOSIS — M25.512 LEFT SHOULDER PAIN: ICD-10-CM

## 2023-06-29 DIAGNOSIS — Z98.890 HISTORY OF REPAIR OF LEFT ROTATOR CUFF: ICD-10-CM

## 2023-06-29 DIAGNOSIS — M19.012 PRIMARY OSTEOARTHRITIS OF LEFT SHOULDER: Primary | ICD-10-CM

## 2023-06-29 LAB
EST. AVERAGE GLUCOSE BLD GHB EST-MCNC: 131.2 MG/DL
HBA1C MFR BLD: 6.2 %

## 2023-06-29 PROCEDURE — 73030 X-RAY EXAM OF SHOULDER: CPT | Mod: LT,,, | Performed by: ORTHOPAEDIC SURGERY

## 2023-06-29 PROCEDURE — 73030 XR SHOULDER COMPLETE 2 OR MORE VIEWS LEFT: ICD-10-PCS | Mod: LT,,, | Performed by: ORTHOPAEDIC SURGERY

## 2023-06-29 PROCEDURE — 36415 COLL VENOUS BLD VENIPUNCTURE: CPT

## 2023-06-29 PROCEDURE — 99204 PR OFFICE/OUTPT VISIT, NEW, LEVL IV, 45-59 MIN: ICD-10-PCS | Mod: ,,, | Performed by: ORTHOPAEDIC SURGERY

## 2023-06-29 PROCEDURE — 99204 OFFICE O/P NEW MOD 45 MIN: CPT | Mod: ,,, | Performed by: ORTHOPAEDIC SURGERY

## 2023-06-29 PROCEDURE — 83036 HEMOGLOBIN GLYCOSYLATED A1C: CPT

## 2023-06-29 NOTE — PROGRESS NOTES
[facility]  Orthopedic Clinic Note      Chief Complaint:   Chief Complaint   Patient presents with    Shoulder Pain     left shoulder pain. previous sx about one year ago at [facility]. pt states the shoulder felt good for a while after surgery but just started hurtin again recently. xrays done today.     Referring Physician: Juanito Good MD      History of Present Illness:    This is a 58 y.o. year old female presenting with complaints of left shoulder pain for the past 6 months.  Patient has noticed progressive decrease in range of motion and weakness.  Patient states pain is moderate to severe.  Patient complains of night pain.  Patient been treated with prescription Norco and ibuprofen, as well as physical therapy with no improvement in her symptoms.  She is a history of prior left rotator cuff repair approximately 1 year ago and is still currently in physical therapy.      Past Medical History:   Diagnosis Date    Arthritis     Depression     Diabetes mellitus     Diabetes mellitus type I     Head ache     Hepatitis C     High cholesterol     Hypertension        Past Surgical History:   Procedure Laterality Date    CARPAL TUNNEL RELEASE       SECTION      CHOLECYSTECTOMY      TOTAL KNEE ARTHROPLASTY         Current Outpatient Medications   Medication Sig    albuterol (PROVENTIL/VENTOLIN HFA) 90 mcg/actuation inhaler Ventolin HFA 90 mcg/actuation aerosol inhaler    aspirin (ECOTRIN) 81 MG EC tablet Ecotrin Low Strength 81 mg tablet,enteric coated   Take 1 tablet every day by oral route.    carvedilol (COREG) 25 MG tablet Take 25 mg by mouth 2 (two) times daily.    clonazePAM (KLONOPIN) 2 MG Tab Take 2 mg by mouth 2 (two) times daily.    diclofenac sodium (VOLTAREN) 1 % Gel     fluticasone propionate (FLONASE) 50 mcg/actuation nasal spray 1 spray by Each Nostril route once daily.    HYDROcodone-acetaminophen (NORCO)  mg per tablet Take 1 tablet by mouth every 6 (six) hours as needed.     "ibuprofen (ADVIL,MOTRIN) 800 MG tablet  mg tablet   Take 1 tablet 3 times a day by oral route.    insulin glargine 100 units/mL SubQ pen Lantus Solostar U-100 Insulin 100 unit/mL (3 mL) subcutaneous pen    irbesartan (AVAPRO) 150 MG tablet Take 150 mg by mouth once daily.    latanoprost 0.005 % ophthalmic solution     loratadine (CLARITIN) 10 mg tablet Take 10 mg by mouth once daily.    metFORMIN (GLUCOPHAGE) 1000 MG tablet Take 1,000 mg by mouth 2 (two) times daily.    montelukast (SINGULAIR) 10 mg tablet Take 10 mg by mouth once daily.    omeprazole (PRILOSEC) 40 MG capsule     potassium chloride (MICRO-K) 10 MEQ CpSR potassium chloride ER 10 mEq capsule,extended release    pravastatin (PRAVACHOL) 20 MG tablet Take 20 mg by mouth once daily.    promethazine-codeine 6.25-10 mg/5 ml (PHENERGAN WITH CODEINE) 6.25-10 mg/5 mL syrup TAKE 1 TEASPOONFUL BY MOUTH EVERY 6 HOURS    timolol maleate 0.5% (TIMOPTIC) 0.5 % Drop     travoprost (TRAVATAN Z) 0.004 % ophthalmic solution Travatan Z 0.004 % eye drops    triamterene-hydrochlorothiazide 75-50 mg (MAXZIDE) 75-50 mg per tablet      Current Facility-Administered Medications   Medication    conjugated estrogens vaginal cream 0.5 g       Review of patient's allergies indicates:  No Known Allergies    Family History   Problem Relation Age of Onset    No Known Problems Mother     No Known Problems Father        Social History     Socioeconomic History    Marital status: Single   Tobacco Use    Smoking status: Never    Smokeless tobacco: Never   Substance and Sexual Activity    Alcohol use: Never    Drug use: Never    Sexual activity: Yes     Partners: Male         Review of Systems:    All review of systems negative except for those stated in the HPI    Examination:    Vital Signs:    Vitals:    06/29/23 1402   Weight: 116.1 kg (256 lb)   Height: 5' 4" (1.626 m)       Body mass index is 43.94 kg/m².    Physical Exam:   General: Well-developed, well-nourished.  Neuro: " Alert and oriented x 3.  Psych: Normal mood and affect.  Card: Regular rate and rhythm  Resp: Respirations regular and unlabored  Left Shoulder Exam:  No obvious deformity. No medial or lateral scapula winging.  Forward flexion to 90 degrees.  Abduction to 90 degrees.  External and internal rotation to 50 degrees.  4/5 strength, normal skin appearance and palpable pulses distally. Sensibility normal.      Imaging: X-rays ordered and images interpreted today personally by me of four views of the left shoulder demonstrate inferior humeral head osteophytes and glenohumeral joint space narrowing with bone-on-bone articulation and sclerosis.     Assessment: Primary osteoarthritis of left shoulder    History of repair of left rotator cuff    Type 2 diabetes mellitus with chronic kidney disease, with long-term current use of insulin, unspecified CKD stage  -     Hemoglobin A1C; Future; Expected date: 06/29/2023    Obesity, morbid    Pain management    Left shoulder pain  -     Ambulatory referral/consult to Orthopedics  -     X-Ray Shoulder 2 or More Views Left; Future; Expected date: 06/29/2023        Plan:  X-rays were reviewed with the patient.  Her continued symptoms are likely stemming from the osteoarthritic changes that have progressed in her left shoulder.  She is unable to tolerate oral anti-inflammatories secondary to chronic kidney disease.  She has exhausted formal physical therapy.  Due to her failure to respond to conservative treatments, recommend surgical intervention via left reverse total shoulder arthroplasty.  She is interested in proceeding with surgical intervention, but would like to wait until the fall.  Plan to obtain hemoglobin A1c to assess glycemic control.  Referral entered to cardiology for preoperative cardiac evaluation.  She will return to clinic for preoperative evaluation in approximately 6 weeks.  At that time, we will order a CT scan of her shoulder for surgical planning.  She  verbalized understanding of the plan of care with no further questions.    She is currently prescribed Norco 10 mg/325 mg every 6-8 hours by her pain management specialist.    Hemal Rey MD personally performed the services described in this documentation, including but not limited to patient's history, physical examination, and assessment and plan of care. All medical record entries made by CHANI Stringer were performed at his direction and in his presence. The medical record was reviewed and is accurate and complete.           Follow up in about 6 weeks (around 8/10/2023) for Preoperative evaluation for left reverse total shoulder arthroplasty.    DISCLAIMER: This note may have been dictated using voice recognition software and may contain grammatical errors.     NOTE: Consult report sent to referring provider via [employer] EMR.

## 2023-08-24 ENCOUNTER — OFFICE VISIT (OUTPATIENT)
Dept: ORTHOPEDICS | Facility: CLINIC | Age: 58
End: 2023-08-24
Payer: MEDICARE

## 2023-08-24 VITALS — WEIGHT: 256 LBS | BODY MASS INDEX: 43.71 KG/M2 | HEIGHT: 64 IN

## 2023-08-24 DIAGNOSIS — E11.22 TYPE 2 DIABETES MELLITUS WITH CHRONIC KIDNEY DISEASE, WITH LONG-TERM CURRENT USE OF INSULIN, UNSPECIFIED CKD STAGE: ICD-10-CM

## 2023-08-24 DIAGNOSIS — R52 PAIN MANAGEMENT: ICD-10-CM

## 2023-08-24 DIAGNOSIS — Z98.890 HISTORY OF REPAIR OF LEFT ROTATOR CUFF: ICD-10-CM

## 2023-08-24 DIAGNOSIS — E66.01 OBESITY, MORBID: ICD-10-CM

## 2023-08-24 DIAGNOSIS — M19.012 PRIMARY OSTEOARTHRITIS OF LEFT SHOULDER: Primary | ICD-10-CM

## 2023-08-24 DIAGNOSIS — Z79.4 TYPE 2 DIABETES MELLITUS WITH CHRONIC KIDNEY DISEASE, WITH LONG-TERM CURRENT USE OF INSULIN, UNSPECIFIED CKD STAGE: ICD-10-CM

## 2023-08-24 PROCEDURE — 99213 OFFICE O/P EST LOW 20 MIN: CPT | Mod: ,,, | Performed by: ORTHOPAEDIC SURGERY

## 2023-08-24 PROCEDURE — 99213 PR OFFICE/OUTPT VISIT, EST, LEVL III, 20-29 MIN: ICD-10-PCS | Mod: ,,, | Performed by: ORTHOPAEDIC SURGERY

## 2023-08-24 NOTE — PROGRESS NOTES
[facility]  Orthopedic Clinic Note      Chief Complaint:   Chief Complaint   Patient presents with    Pre-op Exam     left reverse total shoulder arthroplasty PRE OP. pt states she recently saw dr. parr who did an MRI of her left shoulder (in media). states he suggested against surgery. would like to know next steps and advice     Referring Physician: No ref. provider found      History of Present Illness:    This is a 58 y.o. year old female presenting with complaints of left shoulder pain for the past 6 months.  Patient has noticed progressive decrease in range of motion and weakness.  Patient states pain is moderate to severe.  Patient complains of night pain.  Patient been treated with prescription Norco and ibuprofen, as well as physical therapy with no improvement in her symptoms.  She is a history of prior left rotator cuff repair approximately 1 year ago and is still currently in physical therapy.  08/24/2023 patient presents for re-evaluation of left shoulder pain.  At her prior visit, we discussed surgical intervention via left reverse total shoulder arthroplasty secondary to her degenerative changes and prior rotator cuff repair.  The plan at her prior visit was to check a hemoglobin A1c to determine glycemic control, as well as obtain preoperative cardiac evaluation.  Hemoglobin A1c last month was 6.2%.  She has yet to be evaluated by a cardiologist.  Since her prior visit, she was evaluated by her PCP who ordered an MRI of her left shoulder.  MRI of the left shoulder demonstrated advanced degenerative changes with rotator cuff tendinopathy.  She states that her PCP recommended against surgical intervention secondary to her age and the inflammation in her rotator cuff.      Past Medical History:   Diagnosis Date    Arthritis     Depression     Diabetes mellitus     Diabetes mellitus type I     Head ache     Hepatitis C     High cholesterol     Hypertension        Past Surgical History:    Procedure Laterality Date    CARPAL TUNNEL RELEASE       SECTION      CHOLECYSTECTOMY      TOTAL KNEE ARTHROPLASTY         Current Outpatient Medications   Medication Sig    albuterol (PROVENTIL/VENTOLIN HFA) 90 mcg/actuation inhaler Ventolin HFA 90 mcg/actuation aerosol inhaler    aspirin (ECOTRIN) 81 MG EC tablet Ecotrin Low Strength 81 mg tablet,enteric coated   Take 1 tablet every day by oral route.    carvedilol (COREG) 25 MG tablet Take 25 mg by mouth 2 (two) times daily.    clonazePAM (KLONOPIN) 2 MG Tab Take 2 mg by mouth 2 (two) times daily.    diclofenac sodium (VOLTAREN) 1 % Gel     fluticasone propionate (FLONASE) 50 mcg/actuation nasal spray 1 spray by Each Nostril route once daily.    HYDROcodone-acetaminophen (NORCO)  mg per tablet Take 1 tablet by mouth every 6 (six) hours as needed.    ibuprofen (ADVIL,MOTRIN) 800 MG tablet  mg tablet   Take 1 tablet 3 times a day by oral route.    insulin glargine 100 units/mL SubQ pen Lantus Solostar U-100 Insulin 100 unit/mL (3 mL) subcutaneous pen    irbesartan (AVAPRO) 150 MG tablet Take 150 mg by mouth once daily.    latanoprost 0.005 % ophthalmic solution     loratadine (CLARITIN) 10 mg tablet Take 10 mg by mouth once daily.    metFORMIN (GLUCOPHAGE) 1000 MG tablet Take 1,000 mg by mouth 2 (two) times daily.    montelukast (SINGULAIR) 10 mg tablet Take 10 mg by mouth once daily.    omeprazole (PRILOSEC) 40 MG capsule     potassium chloride (MICRO-K) 10 MEQ CpSR potassium chloride ER 10 mEq capsule,extended release    pravastatin (PRAVACHOL) 20 MG tablet Take 20 mg by mouth once daily.    promethazine-codeine 6.25-10 mg/5 ml (PHENERGAN WITH CODEINE) 6.25-10 mg/5 mL syrup TAKE 1 TEASPOONFUL BY MOUTH EVERY 6 HOURS    timolol maleate 0.5% (TIMOPTIC) 0.5 % Drop     travoprost (TRAVATAN Z) 0.004 % ophthalmic solution Travatan Z 0.004 % eye drops    triamterene-hydrochlorothiazide 75-50 mg (MAXZIDE) 75-50 mg per tablet      Current  "Facility-Administered Medications   Medication    conjugated estrogens vaginal cream 0.5 g       Review of patient's allergies indicates:  No Known Allergies    Family History   Problem Relation Age of Onset    No Known Problems Mother     No Known Problems Father        Social History     Socioeconomic History    Marital status: Single   Tobacco Use    Smoking status: Never    Smokeless tobacco: Never   Substance and Sexual Activity    Alcohol use: Never    Drug use: Never    Sexual activity: Yes     Partners: Male         Review of Systems:    All review of systems negative except for those stated in the HPI    Examination:    Vital Signs:    Vitals:    08/24/23 0754   Weight: 116.1 kg (256 lb)   Height: 5' 4" (1.626 m)       Body mass index is 43.94 kg/m².    Physical Exam:   General: Well-developed, well-nourished.  Neuro: Alert and oriented x 3.  Psych: Normal mood and affect.  Card: Regular rate and rhythm  Resp: Respirations regular and unlabored  Left Shoulder Exam:  No obvious deformity. No medial or lateral scapula winging.  Forward flexion to 90 degrees.  Abduction to 90 degrees.  External and internal rotation to 50 degrees.  4/5 strength, normal skin appearance and palpable pulses distally. Sensibility normal.      Imaging:  Prior four views of the left shoulder demonstrate inferior humeral head osteophytes and glenohumeral joint space narrowing with bone-on-bone articulation and sclerosis.     Assessment: Primary osteoarthritis of left shoulder    History of repair of left rotator cuff    Type 2 diabetes mellitus with chronic kidney disease, with long-term current use of insulin, unspecified CKD stage    Obesity, morbid    Pain management        Plan:  MRI results were reviewed with the patient.  We discussed that while there is evidence of inflammation in her rotator cuff, most of the symptoms in her shoulder are stemming from the advanced degenerative changes in her shoulder evidenced by bone-on-bone " articulation of the glenohumeral joint.  These degenerative changes are leading to loss of range of motion and continued pain in her shoulder.  Recommendations remain unchanged.  If this patient like to definitively treat the symptoms in her shoulder, surgical intervention is the only option.  Plan to reach out to her PCP regarding surgical intervention in the presence of the patient's other chronic medical conditions, which appear to be well controlled at this time.  We discussed possible corticosteroid injection, but she would like to defer because she is still very interested in proceeding with surgery this fall as long as we collaborate with her PCP.  She will return to clinic for possible preoperative evaluation for left reverse total shoulder arthroplasty.  She verbalized understanding of the plan of care with no further questions.    She is currently prescribed Norco 10 mg/325 mg every 6-8 hours by her pain management specialist.    Hemal Rey MD personally performed the services described in this documentation, including but not limited to patient's history, physical examination, and assessment and plan of care. All medical record entries made by CHANI Stringer were performed at his direction and in his presence. The medical record was reviewed and is accurate and complete.           Follow up for Possible preoperative evaluation for left reverse total shoulder arthroplasty.    DISCLAIMER: This note may have been dictated using voice recognition software and may contain grammatical errors.     NOTE: Consult report sent to referring provider via rumr: turn off the lights.

## 2023-08-28 ENCOUNTER — TELEPHONE (OUTPATIENT)
Dept: ORTHOPEDICS | Facility: CLINIC | Age: 58
End: 2023-08-28
Payer: MEDICARE

## 2023-09-07 ENCOUNTER — PATIENT MESSAGE (OUTPATIENT)
Dept: RESEARCH | Facility: HOSPITAL | Age: 58
End: 2023-09-07
Payer: MEDICARE

## 2023-10-19 ENCOUNTER — HOSPITAL ENCOUNTER (EMERGENCY)
Facility: HOSPITAL | Age: 58
Discharge: HOME | End: 2023-10-19
Attending: STUDENT IN AN ORGANIZED HEALTH CARE EDUCATION/TRAINING PROGRAM
Payer: MEDICARE

## 2023-10-19 VITALS
OXYGEN SATURATION: 98 % | SYSTOLIC BLOOD PRESSURE: 172 MMHG | TEMPERATURE: 98 F | DIASTOLIC BLOOD PRESSURE: 95 MMHG | WEIGHT: 246.94 LBS | HEART RATE: 85 BPM | BODY MASS INDEX: 42.38 KG/M2 | RESPIRATION RATE: 16 BRPM

## 2023-10-19 DIAGNOSIS — I10 HYPERTENSION, UNSPECIFIED TYPE: Primary | ICD-10-CM

## 2023-10-19 LAB
ALBUMIN SERPL-MCNC: 3.5 G/DL (ref 3.4–4.8)
ALBUMIN/GLOB SERPL: 1 RATIO (ref 1.1–2)
ALP SERPL-CCNC: 68 UNIT/L (ref 40–150)
ALT SERPL-CCNC: 12 UNIT/L (ref 0–55)
APPEARANCE UR: CLEAR
AST SERPL-CCNC: 10 UNIT/L (ref 5–34)
BACTERIA #/AREA URNS AUTO: ABNORMAL /HPF
BASOPHILS # BLD AUTO: 0.04 X10(3)/MCL
BASOPHILS NFR BLD AUTO: 0.7 %
BILIRUB SERPL-MCNC: 0.5 MG/DL
BILIRUB UR QL STRIP.AUTO: NEGATIVE
BUN SERPL-MCNC: 24.7 MG/DL (ref 9.8–20.1)
CALCIUM SERPL-MCNC: 10.4 MG/DL (ref 8.4–10.2)
CHLORIDE SERPL-SCNC: 108 MMOL/L (ref 98–107)
CO2 SERPL-SCNC: 26 MMOL/L (ref 23–31)
COLOR UR AUTO: ABNORMAL
CREAT SERPL-MCNC: 1.21 MG/DL (ref 0.55–1.02)
EOSINOPHIL # BLD AUTO: 0.15 X10(3)/MCL (ref 0–0.9)
EOSINOPHIL NFR BLD AUTO: 2.8 %
ERYTHROCYTE [DISTWIDTH] IN BLOOD BY AUTOMATED COUNT: 13.2 % (ref 11.5–17)
GFR SERPLBLD CREATININE-BSD FMLA CKD-EPI: 51 MLS/MIN/1.73/M2
GLOBULIN SER-MCNC: 3.6 GM/DL (ref 2.4–3.5)
GLUCOSE SERPL-MCNC: 94 MG/DL (ref 82–115)
GLUCOSE UR QL STRIP.AUTO: NORMAL
HCT VFR BLD AUTO: 38 % (ref 37–47)
HGB BLD-MCNC: 12 G/DL (ref 12–16)
HYALINE CASTS #/AREA URNS LPF: ABNORMAL /LPF
IMM GRANULOCYTES # BLD AUTO: 0.01 X10(3)/MCL (ref 0–0.04)
IMM GRANULOCYTES NFR BLD AUTO: 0.2 %
KETONES UR QL STRIP.AUTO: NEGATIVE
LEUKOCYTE ESTERASE UR QL STRIP.AUTO: NEGATIVE
LYMPHOCYTES # BLD AUTO: 1.48 X10(3)/MCL (ref 0.6–4.6)
LYMPHOCYTES NFR BLD AUTO: 27.5 %
MAGNESIUM SERPL-MCNC: 1.5 MG/DL (ref 1.6–2.6)
MCH RBC QN AUTO: 28.5 PG (ref 27–31)
MCHC RBC AUTO-ENTMCNC: 31.6 G/DL (ref 33–36)
MCV RBC AUTO: 90.3 FL (ref 80–94)
MONOCYTES # BLD AUTO: 0.46 X10(3)/MCL (ref 0.1–1.3)
MONOCYTES NFR BLD AUTO: 8.6 %
MUCOUS THREADS URNS QL MICRO: ABNORMAL /LPF
NEUTROPHILS # BLD AUTO: 3.24 X10(3)/MCL (ref 2.1–9.2)
NEUTROPHILS NFR BLD AUTO: 60.2 %
NITRITE UR QL STRIP.AUTO: NEGATIVE
NRBC BLD AUTO-RTO: 0 %
PH UR STRIP.AUTO: 5.5 [PH]
PLATELET # BLD AUTO: 339 X10(3)/MCL (ref 130–400)
PMV BLD AUTO: 9.7 FL (ref 7.4–10.4)
POTASSIUM SERPL-SCNC: 4.3 MMOL/L (ref 3.5–5.1)
PROT SERPL-MCNC: 7.1 GM/DL (ref 5.8–7.6)
PROT UR QL STRIP.AUTO: ABNORMAL
RBC # BLD AUTO: 4.21 X10(6)/MCL (ref 4.2–5.4)
RBC #/AREA URNS AUTO: ABNORMAL /HPF
RBC UR QL AUTO: NEGATIVE
SODIUM SERPL-SCNC: 142 MMOL/L (ref 136–145)
SP GR UR STRIP.AUTO: 1.02 (ref 1–1.03)
SQUAMOUS #/AREA URNS LPF: ABNORMAL /HPF
TROPONIN I SERPL-MCNC: 0.02 NG/ML (ref 0–0.04)
UROBILINOGEN UR STRIP-ACNC: NORMAL
WBC # SPEC AUTO: 5.38 X10(3)/MCL (ref 4.5–11.5)
WBC #/AREA URNS AUTO: ABNORMAL /HPF

## 2023-10-19 PROCEDURE — 85025 COMPLETE CBC W/AUTO DIFF WBC: CPT | Performed by: PHYSICIAN ASSISTANT

## 2023-10-19 PROCEDURE — 80053 COMPREHEN METABOLIC PANEL: CPT | Performed by: PHYSICIAN ASSISTANT

## 2023-10-19 PROCEDURE — 84484 ASSAY OF TROPONIN QUANT: CPT | Performed by: PHYSICIAN ASSISTANT

## 2023-10-19 PROCEDURE — 99284 EMERGENCY DEPT VISIT MOD MDM: CPT | Mod: 25

## 2023-10-19 PROCEDURE — 83735 ASSAY OF MAGNESIUM: CPT | Performed by: PHYSICIAN ASSISTANT

## 2023-10-19 PROCEDURE — 81001 URINALYSIS AUTO W/SCOPE: CPT | Performed by: PHYSICIAN ASSISTANT

## 2023-10-19 PROCEDURE — 25000003 PHARM REV CODE 250: Performed by: PHYSICIAN ASSISTANT

## 2023-10-19 RX ORDER — HYDRALAZINE HYDROCHLORIDE 25 MG/1
25 TABLET, FILM COATED ORAL
Status: COMPLETED | OUTPATIENT
Start: 2023-10-19 | End: 2023-10-19

## 2023-10-19 RX ORDER — TRIAMTERENE AND HYDROCHLOROTHIAZIDE 37.5; 25 MG/1; MG/1
2 CAPSULE ORAL
Status: DISCONTINUED | OUTPATIENT
Start: 2023-10-19 | End: 2023-10-19

## 2023-10-19 RX ORDER — CLONIDINE HYDROCHLORIDE 0.1 MG/1
0.2 TABLET ORAL
Status: COMPLETED | OUTPATIENT
Start: 2023-10-19 | End: 2023-10-19

## 2023-10-19 RX ADMIN — CLONIDINE HYDROCHLORIDE 0.2 MG: 0.1 TABLET ORAL at 02:10

## 2023-10-19 RX ADMIN — HYDRALAZINE HYDROCHLORIDE 25 MG: 25 TABLET ORAL at 12:10

## 2023-10-19 NOTE — ED PROVIDER NOTES
Encounter Date: 10/19/2023       History     Chief Complaint   Patient presents with    Hypertension     PT REPORTS HER BP WAS ELEVATED AT HOME THIS AM. PT CO HA AND SINUS PRESSURE X 4 MONTHS.  PT REQUESTING CT. COMPLIANT W MEDS.      Headache     Marylin Jennings is a 58 y.o. female with a PMHx of HTN presents to the ED with complaints of headache and high blood pressure that started 3 month(s) ago. She reports she takes Hydralazine 25 mg PO for her blood pressure. Report she did not take it today. Denies blurred vision, chest pain, dizziness, shortness of breath.        The history is provided by the patient. No  was used.     Review of patient's allergies indicates:  No Known Allergies  Past Medical History:   Diagnosis Date    Arthritis     Depression     Diabetes mellitus     Diabetes mellitus type I     Head ache     Hepatitis C     High cholesterol     Hypertension      Past Surgical History:   Procedure Laterality Date    CARPAL TUNNEL RELEASE       SECTION      CHOLECYSTECTOMY      TOTAL KNEE ARTHROPLASTY       Family History   Problem Relation Age of Onset    No Known Problems Mother     No Known Problems Father      Social History     Tobacco Use    Smoking status: Never    Smokeless tobacco: Never   Substance Use Topics    Alcohol use: Never    Drug use: Never     Review of Systems   Constitutional:  Negative for chills, fatigue and fever.   HENT:  Negative for congestion, ear pain, sinus pain and sore throat.    Eyes:  Negative for pain.   Respiratory:  Negative for cough, chest tightness and shortness of breath.    Cardiovascular:  Negative for chest pain.   Gastrointestinal:  Negative for abdominal pain, constipation, diarrhea, nausea and vomiting.   Genitourinary:  Negative for dysuria.   Musculoskeletal:  Negative for back pain and joint swelling.   Skin:  Negative for color change and rash.   Neurological:  Positive for headaches. Negative for dizziness, seizures, syncope,  facial asymmetry, weakness, light-headedness and numbness.   Psychiatric/Behavioral:  Negative for behavioral problems and confusion.        Physical Exam     Initial Vitals [10/19/23 0941]   BP Pulse Resp Temp SpO2   (!) 181/94 85 16 97.9 °F (36.6 °C) 98 %      MAP       --         Physical Exam    Nursing note and vitals reviewed.  Constitutional: She appears well-developed and well-nourished.   HENT:   Head: Normocephalic and atraumatic.   Nose: Right sinus exhibits maxillary sinus tenderness. Left sinus exhibits maxillary sinus tenderness.   Eyes: EOM are normal. Pupils are equal, round, and reactive to light.   Neck: Neck supple. No thyromegaly present. No JVD present.   Normal range of motion.  Cardiovascular:  Normal rate, regular rhythm, normal heart sounds and intact distal pulses.           No murmur heard.  Pulmonary/Chest: Breath sounds normal. No respiratory distress. She has no wheezes. She has no rhonchi. She has no rales. She exhibits no tenderness.   Abdominal: Abdomen is soft. Bowel sounds are normal. She exhibits no distension. There is no abdominal tenderness. There is no rebound and no guarding.   Musculoskeletal:         General: No tenderness or edema. Normal range of motion.      Cervical back: Normal range of motion and neck supple.     Lymphadenopathy:     She has no cervical adenopathy.   Neurological: She is alert and oriented to person, place, and time.   Skin: Skin is warm and dry. Capillary refill takes less than 2 seconds.   Psychiatric: She has a normal mood and affect. Thought content normal.         ED Course   Procedures  Labs Reviewed   COMPREHENSIVE METABOLIC PANEL - Abnormal; Notable for the following components:       Result Value    Chloride 108 (*)     Blood Urea Nitrogen 24.7 (*)     Creatinine 1.21 (*)     Calcium Level Total 10.4 (*)     Globulin 3.6 (*)     Albumin/Globulin Ratio 1.0 (*)     All other components within normal limits   URINALYSIS, REFLEX TO URINE CULTURE  - Abnormal; Notable for the following components:    Protein, UA 1+ (*)     Squamous Epithelial Cells, UA Trace (*)     Mucous, UA Trace (*)     All other components within normal limits   MAGNESIUM - Abnormal; Notable for the following components:    Magnesium Level 1.50 (*)     All other components within normal limits   CBC WITH DIFFERENTIAL - Abnormal; Notable for the following components:    MCHC 31.6 (*)     All other components within normal limits   TROPONIN I - Normal   CBC W/ AUTO DIFFERENTIAL    Narrative:     The following orders were created for panel order CBC auto differential.  Procedure                               Abnormality         Status                     ---------                               -----------         ------                     CBC with Differential[[id number]]        Abnormal            Final result                 Please view results for these tests on the individual orders.   EXTRA TUBES    Narrative:     The following orders were created for panel order EXTRA TUBES.  Procedure                               Abnormality         Status                     ---------                               -----------         ------                     Light Blue Top Hold[[id number]]                              In process                 Gold Top Hold[[id number]]                                   In process                 Pink Top Hold[[id number]]                                   In process                   Please view results for these tests on the individual orders.   LIGHT BLUE TOP HOLD   GOLD TOP HOLD   PINK TOP HOLD          Imaging Results              CT Head Without Contrast (Final result)  Result time 10/19/23 12:01:20      Final result by Josh Prasad MD (10/19/23 12:01:20)                   Impression:      No acute intracranial findings or significant interval change compared to January 2023.      Electronically signed by: Josh Prasad  Date:    10/19/2023  Time:    12:01                Narrative:    EXAMINATION:  CT HEAD WITHOUT CONTRAST    CLINICAL HISTORY:  htn and headache;    TECHNIQUE:  CT imaging of the head performed from the skull base to the vertex without intravenous contrast. DLP 2592 mGycm. Automatic exposure control, adjustment of mA/kV or iterative reconstruction technique was used to reduce radiation.    COMPARISON:  12 January 2023    FINDINGS:  There is no acute cortical infarct, hemorrhage or mass lesion.  No new parenchymal attenuation abnormality.  Ventricular size is stable.  There are vascular calcifications.    Visualized paranasal sinuses and mastoid air cells are clear.                                       Medications   hydrALAZINE tablet 25 mg (25 mg Oral Given 10/19/23 1229)   cloNIDine tablet 0.2 mg (0.2 mg Oral Given 10/19/23 1423)     Medical Decision Making  DDX: HTN, hypertensive urgency, hypertensive emergency, among other      Patient presents to the ED with complaints of a headache and elevated blood pressure readings x 3 months. She reports she is on Hydralazine 25 mg BID for her pressure. She denies blurred vision, dizziness, LOC, syncope, chest pain, shortness of breath. Labs show no end organ damage. CT head normal without acute abnormality. Neuro exam with no deficits. Patient given her home dose of medication because she reports she did not take it this morning. Pressure readings remained 190s/110s. Gave Clonidine 0.2 mg. Pressure down to 172/95 and patietn reports improvement of headache. I have instructed her to keep a blood pressure log and to follow up with her PCP within 1 week. She verbalized understanding. Strict return precautions given. Stable for discharge.     Amount and/or Complexity of Data Reviewed  Labs: ordered.  Radiology: ordered.    Risk  Prescription drug management.         APC / Resident Notes:   I was not physically present during the history or exam of this patient. I was available at all times for consultation.  (Britany)                        Clinical Impression:   Final diagnoses:  [I10] Hypertension, unspecified type (Primary)        ED Disposition Condition    Discharge Stable          ED Prescriptions    None       Follow-up Information       Follow up With Specialties Details Why Contact Info    Juanito Good MD Family Medicine   [address] [facility]  [address]  [place] [address]      [facility] Emergency Medicine In 3 days As needed, If symptoms worsen [address] [place] [address] [phone]             Ewa Curtis PA-C  10/19/23 1817       Nikita Garg MD  10/19/23 2036

## 2023-12-01 ENCOUNTER — LAB VISIT (OUTPATIENT)
Dept: LAB | Facility: HOSPITAL | Age: 58
End: 2023-12-01
Attending: INTERNAL MEDICINE
Payer: MEDICARE

## 2023-12-01 DIAGNOSIS — E11.9 DIABETES MELLITUS, TYPE 2: Primary | ICD-10-CM

## 2023-12-01 DIAGNOSIS — B18.2 HEPATITIS C CARRIER: ICD-10-CM

## 2023-12-01 DIAGNOSIS — Z01.818 PRE-OP EVALUATION: ICD-10-CM

## 2023-12-01 DIAGNOSIS — I10 HYPERTENSION, UNSPECIFIED TYPE: ICD-10-CM

## 2023-12-01 LAB
ALBUMIN SERPL-MCNC: 3.5 G/DL (ref 3.4–4.8)
ALBUMIN/GLOB SERPL: 1 RATIO (ref 1.1–2)
ALP SERPL-CCNC: 92 UNIT/L (ref 40–150)
ALT SERPL-CCNC: 13 UNIT/L (ref 0–55)
AST SERPL-CCNC: 10 UNIT/L (ref 5–34)
BASOPHILS # BLD AUTO: 0.06 X10(3)/MCL
BASOPHILS NFR BLD AUTO: 0.8 %
BILIRUB SERPL-MCNC: 0.4 MG/DL
BUN SERPL-MCNC: 25 MG/DL (ref 9.8–20.1)
CALCIUM SERPL-MCNC: 10.2 MG/DL (ref 8.4–10.2)
CHLORIDE SERPL-SCNC: 109 MMOL/L (ref 98–107)
CHOLEST SERPL-MCNC: 247 MG/DL
CHOLEST/HDLC SERPL: 6 {RATIO} (ref 0–5)
CO2 SERPL-SCNC: 26 MMOL/L (ref 23–31)
CREAT SERPL-MCNC: 1.29 MG/DL (ref 0.55–1.02)
EOSINOPHIL # BLD AUTO: 0.17 X10(3)/MCL (ref 0–0.9)
EOSINOPHIL NFR BLD AUTO: 2.2 %
ERYTHROCYTE [DISTWIDTH] IN BLOOD BY AUTOMATED COUNT: 13.4 % (ref 11.5–17)
GFR SERPLBLD CREATININE-BSD FMLA CKD-EPI: 48 MLS/MIN/1.73/M2
GLOBULIN SER-MCNC: 3.6 GM/DL (ref 2.4–3.5)
GLUCOSE SERPL-MCNC: 121 MG/DL (ref 82–115)
HCT VFR BLD AUTO: 37.2 % (ref 37–47)
HDLC SERPL-MCNC: 40 MG/DL (ref 35–60)
HGB BLD-MCNC: 11.5 G/DL (ref 12–16)
IMM GRANULOCYTES # BLD AUTO: 0.04 X10(3)/MCL (ref 0–0.04)
IMM GRANULOCYTES NFR BLD AUTO: 0.5 %
LDLC SERPL CALC-MCNC: 168 MG/DL (ref 50–140)
LYMPHOCYTES # BLD AUTO: 1.71 X10(3)/MCL (ref 0.6–4.6)
LYMPHOCYTES NFR BLD AUTO: 21.7 %
MCH RBC QN AUTO: 28.5 PG (ref 27–31)
MCHC RBC AUTO-ENTMCNC: 30.9 G/DL (ref 33–36)
MCV RBC AUTO: 92.3 FL (ref 80–94)
MONOCYTES # BLD AUTO: 0.7 X10(3)/MCL (ref 0.1–1.3)
MONOCYTES NFR BLD AUTO: 8.9 %
NEUTROPHILS # BLD AUTO: 5.19 X10(3)/MCL (ref 2.1–9.2)
NEUTROPHILS NFR BLD AUTO: 65.9 %
NRBC BLD AUTO-RTO: 0 %
PLATELET # BLD AUTO: 333 X10(3)/MCL (ref 130–400)
PMV BLD AUTO: 10.1 FL (ref 7.4–10.4)
POTASSIUM SERPL-SCNC: 4.4 MMOL/L (ref 3.5–5.1)
PROT SERPL-MCNC: 7.1 GM/DL (ref 5.8–7.6)
RBC # BLD AUTO: 4.03 X10(6)/MCL (ref 4.2–5.4)
SODIUM SERPL-SCNC: 144 MMOL/L (ref 136–145)
T4 FREE SERPL-MCNC: 0.94 NG/DL (ref 0.7–1.48)
TRIGL SERPL-MCNC: 195 MG/DL (ref 37–140)
TSH SERPL-ACNC: 1.38 UIU/ML (ref 0.35–4.94)
VLDLC SERPL CALC-MCNC: 39 MG/DL
WBC # SPEC AUTO: 7.87 X10(3)/MCL (ref 4.5–11.5)

## 2023-12-01 PROCEDURE — 36415 COLL VENOUS BLD VENIPUNCTURE: CPT

## 2023-12-01 PROCEDURE — 80061 LIPID PANEL: CPT

## 2023-12-01 PROCEDURE — 84443 ASSAY THYROID STIM HORMONE: CPT

## 2023-12-01 PROCEDURE — 85025 COMPLETE CBC W/AUTO DIFF WBC: CPT

## 2023-12-01 PROCEDURE — 84439 ASSAY OF FREE THYROXINE: CPT

## 2023-12-01 PROCEDURE — 80053 COMPREHEN METABOLIC PANEL: CPT

## 2023-12-05 ENCOUNTER — LAB VISIT (OUTPATIENT)
Dept: LAB | Facility: HOSPITAL | Age: 58
End: 2023-12-05
Attending: INTERNAL MEDICINE
Payer: MEDICARE

## 2023-12-05 DIAGNOSIS — E11.9 DIABETES MELLITUS WITHOUT COMPLICATION: ICD-10-CM

## 2023-12-05 DIAGNOSIS — I10 ESSENTIAL HYPERTENSION, MALIGNANT: Primary | ICD-10-CM

## 2023-12-05 LAB
EST. AVERAGE GLUCOSE BLD GHB EST-MCNC: 122.6 MG/DL
HBA1C MFR BLD: 5.9 %

## 2023-12-05 PROCEDURE — 83036 HEMOGLOBIN GLYCOSYLATED A1C: CPT

## 2023-12-05 PROCEDURE — 36415 COLL VENOUS BLD VENIPUNCTURE: CPT

## 2024-04-24 ENCOUNTER — OFFICE VISIT (OUTPATIENT)
Dept: PULMONOLOGY | Facility: CLINIC | Age: 59
End: 2024-04-24
Payer: MEDICARE

## 2024-04-24 VITALS
HEIGHT: 64 IN | SYSTOLIC BLOOD PRESSURE: 109 MMHG | OXYGEN SATURATION: 94 % | RESPIRATION RATE: 18 BRPM | HEART RATE: 71 BPM | DIASTOLIC BLOOD PRESSURE: 73 MMHG | WEIGHT: 251 LBS | BODY MASS INDEX: 42.85 KG/M2

## 2024-04-24 DIAGNOSIS — G47.19 EXCESSIVE DAYTIME SLEEPINESS: ICD-10-CM

## 2024-04-24 DIAGNOSIS — R06.83 LOUD SNORING: ICD-10-CM

## 2024-04-24 DIAGNOSIS — E66.01 MORBID OBESITY: ICD-10-CM

## 2024-04-24 DIAGNOSIS — G47.33 OBSTRUCTIVE SLEEP APNEA ON CPAP: Primary | ICD-10-CM

## 2024-04-24 DIAGNOSIS — I10 HYPERTENSION, UNSPECIFIED TYPE: ICD-10-CM

## 2024-04-24 DIAGNOSIS — R06.81 WITNESSED EPISODE OF APNEA: ICD-10-CM

## 2024-04-24 PROCEDURE — 99204 OFFICE O/P NEW MOD 45 MIN: CPT | Mod: S$GLB,,, | Performed by: INTERNAL MEDICINE
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
## 2024-06-12 ENCOUNTER — TELEPHONE (OUTPATIENT)
Dept: PULMONOLOGY | Facility: CLINIC | Age: 59
End: 2024-06-12
Payer: COMMERCIAL

## 2024-06-12 NOTE — TELEPHONE ENCOUNTER
Return call and spoke with patient. LB  ----- Message from Aissatou Nickerson sent at 6/12/2024  1:53 PM CDT -----  Name of Who is Calling:pt           What is the request in detail:requesting a call regarding questions she has with cpap machine           Can the clinic reply by [facility]:no           What Number to Call Back if not in [facility]: [phone]

## 2024-07-15 ENCOUNTER — OUTSIDE PLACE OF SERVICE (OUTPATIENT)
Dept: PULMONOLOGY | Facility: CLINIC | Age: 59
End: 2024-07-15
Payer: MEDICARE

## 2024-07-17 PROCEDURE — 95811 POLYSOM 6/>YRS CPAP 4/> PARM: CPT | Mod: 26,,, | Performed by: INTERNAL MEDICINE

## 2024-07-18 ENCOUNTER — DOCUMENTATION ONLY (OUTPATIENT)
Dept: SLEEP MEDICINE | Facility: HOSPITAL | Age: 59
End: 2024-07-18
Payer: MEDICARE

## 2024-10-16 ENCOUNTER — OUTSIDE PLACE OF SERVICE (OUTPATIENT)
Dept: PULMONOLOGY | Facility: CLINIC | Age: 59
End: 2024-10-16
Payer: MEDICARE

## 2024-10-18 ENCOUNTER — DOCUMENTATION ONLY (OUTPATIENT)
Dept: SLEEP MEDICINE | Facility: HOSPITAL | Age: 59
End: 2024-10-18
Payer: MEDICARE

## 2024-10-18 PROCEDURE — 95811 POLYSOM 6/>YRS CPAP 4/> PARM: CPT | Mod: 26,,, | Performed by: INTERNAL MEDICINE
